# Patient Record
Sex: MALE | Race: BLACK OR AFRICAN AMERICAN | Employment: UNEMPLOYED | URBAN - METROPOLITAN AREA
[De-identification: names, ages, dates, MRNs, and addresses within clinical notes are randomized per-mention and may not be internally consistent; named-entity substitution may affect disease eponyms.]

---

## 2020-08-03 ENCOUNTER — HOSPITAL ENCOUNTER (OUTPATIENT)
Dept: GENERAL RADIOLOGY | Age: 25
Discharge: HOME | End: 2020-08-05
Payer: COMMERCIAL

## 2020-08-03 ENCOUNTER — HOSPITAL ENCOUNTER (OUTPATIENT)
Age: 25
Discharge: HOME | End: 2020-08-05
Payer: COMMERCIAL

## 2020-08-03 PROCEDURE — 73610 X-RAY EXAM OF ANKLE: CPT

## 2021-06-04 ENCOUNTER — HOSPITAL ENCOUNTER (EMERGENCY)
Age: 26
Discharge: HOME | End: 2021-06-04
Payer: COMMERCIAL

## 2021-06-04 VITALS
HEART RATE: 87 BPM | RESPIRATION RATE: 16 BRPM | DIASTOLIC BLOOD PRESSURE: 68 MMHG | TEMPERATURE: 97.9 F | SYSTOLIC BLOOD PRESSURE: 136 MMHG | OXYGEN SATURATION: 99 %

## 2021-06-04 DIAGNOSIS — Z20.2 EXPOSURE TO STD: Primary | ICD-10-CM

## 2021-06-04 PROCEDURE — 6370000000 HC RX 637 (ALT 250 FOR IP): Performed by: PHYSICIAN ASSISTANT

## 2021-06-04 PROCEDURE — 6360000002 HC RX W HCPCS: Performed by: PHYSICIAN ASSISTANT

## 2021-06-04 PROCEDURE — 96372 THER/PROPH/DIAG INJ SC/IM: CPT

## 2021-06-04 PROCEDURE — 2500000003 HC RX 250 WO HCPCS

## 2021-06-04 PROCEDURE — 99283 EMERGENCY DEPT VISIT LOW MDM: CPT

## 2021-06-04 RX ORDER — CEFTRIAXONE 1 G/1
500 INJECTION, POWDER, FOR SOLUTION INTRAMUSCULAR; INTRAVENOUS ONCE
Status: COMPLETED | OUTPATIENT
Start: 2021-06-04 | End: 2021-06-04

## 2021-06-04 RX ORDER — LIDOCAINE HYDROCHLORIDE 10 MG/ML
INJECTION, SOLUTION EPIDURAL; INFILTRATION; INTRACAUDAL; PERINEURAL
Status: COMPLETED
Start: 2021-06-04 | End: 2021-06-04

## 2021-06-04 RX ORDER — AZITHROMYCIN 250 MG/1
1000 TABLET, FILM COATED ORAL ONCE
Status: COMPLETED | OUTPATIENT
Start: 2021-06-04 | End: 2021-06-04

## 2021-06-04 RX ADMIN — LIDOCAINE HYDROCHLORIDE 2 ML: 10 INJECTION, SOLUTION EPIDURAL; INFILTRATION; INTRACAUDAL; PERINEURAL at 17:28

## 2021-06-04 RX ADMIN — AZITHROMYCIN 1000 MG: 250 TABLET, FILM COATED ORAL at 17:27

## 2021-06-04 RX ADMIN — CEFTRIAXONE 500 MG: 1 INJECTION, POWDER, FOR SOLUTION INTRAMUSCULAR; INTRAVENOUS at 17:27

## 2021-06-04 NOTE — ED PROVIDER NOTES
[facility]                                                                                                                                    Department of Emergency Medicine   ED  Provider Note  Admit Date/RoomTime: 6/4/2021  3:52 PM  ED Room: 29/29        HPI:  6/4/21,   Time: 5:03 PM EDT         Todd Spatz is a 22 y.o. male presenting to the ED for exposure to STD, beginning few days ago. The complaint has been persistent, moderate in severity, and worsened by nothing. The patient states that his girlfriend informed him that she had chlamydia. They have been sexually active the night in the past few days. He states that he has no symptoms whatsoever. Denies any urinary burning, frequency, testicular swelling or flank pain. ROS:     Constitutional: Negative for fever and chills  HENT: Negative for ear pain, sore throat and sinus pressure  Eyes: Negative for pain, discharge and redness  Respiratory:  Negative for shortness of breath, cough and wheezing  Cardiovascular: Negative for CP, edema or palpitations  Gastrointestinal: Negative for nausea, vomiting, diarrhea and abdominal distention  Genitourinary:  See HPI  Musculoskeletal: Negative for back pain and arthralgia  Skin: Negative for rash and wound  Neurological: Negative for weakness and headaches  Hematological: Negative for adenopathy    All other systems reviewed and are negative      -------------------------------- PAST HISTORY ----------------------------------  Past Medical History:  has no past medical history on file. Past Surgical History:  has no past surgical history on file. Social History:  reports that he has never smoked. He has never used smokeless tobacco. He reports that he does not drink alcohol and does not use drugs. Family History: family history is not on file. The patients home medications have been reviewed. Allergies: Patient has no known allergies.     --------------------------------- RESULTS ------------------------------------------  All laboratory and radiology results have been personally reviewed by myself   LABS:  No results found for this visit on 06/04/21. RADIOLOGY:  Interpreted by Radiologist.  No orders to display       ----------------- NURSING NOTES AND VITALS REVIEWED ---------------   The nursing notes within the ED encounter and vital signs as below have been reviewed. /68   Pulse 87   Temp 97.9 °F (36.6 °C) (Tympanic)   Resp 16   SpO2 99%   Oxygen Saturation Interpretation: Normal      --------------------------------PHYSICAL EXAM------------------------------------      Constitutional/General: Alert and oriented x3, well appearing, non toxic in NAD  Head: NC/AT  Eyes: PERRL, EOMI  Mouth: Oropharynx clear, handling secretions, no trismus  Neck: Supple, full ROM, no meningeal signs  Pulmonary: Lungs clear to auscultation bilaterally, no wheezes, rales, or rhonchi. Not in respiratory distress  Cardiovascular:  Regular rate and rhythm, no murmurs, gallops, or rubs. 2+ distal pulses  Abdomen: Soft, + BS. No distension. Nontender. No palpable rigidity, rebound or guarding  :  Deferred  Extremities: Moves all extremities x 4. Warm and well perfused  Skin: warm and dry without rash  Neurologic: GCS 15,  Intact. No focal deficits  Psych: Normal Affect      ------------------------ ED COURSE/MEDICAL DECISION MAKING----------------------  Medications   cefTRIAXone (ROCEPHIN) injection 500 mg (has no administration in time range)   azithromycin (ZITHROMAX) tablet 1,000 mg (has no administration in time range)         Medical Decision Making:    Pt will be treated today with Rocephin and Zithromax. Discussed need to ensure no sexual intercourse for 7 full days. F/U PCP if persistent or ongoing complaints. Counseling:    The emergency provider has spoken with the patient and discussed todays results, in addition to providing specific details for the plan of care and

## 2022-05-14 ENCOUNTER — HOSPITAL ENCOUNTER (INPATIENT)
Age: 27
LOS: 5 days | Discharge: HOME | DRG: 753 | End: 2022-05-20
Attending: EMERGENCY MEDICINE | Admitting: PSYCHIATRY & NEUROLOGY
Payer: MEDICAID

## 2022-05-14 DIAGNOSIS — S61.217A LACERATION OF LEFT LITTLE FINGER WITHOUT FOREIGN BODY WITHOUT DAMAGE TO NAIL, INITIAL ENCOUNTER: Primary | ICD-10-CM

## 2022-05-14 DIAGNOSIS — F32.A DEPRESSION, UNSPECIFIED DEPRESSION TYPE: ICD-10-CM

## 2022-05-14 LAB
ACETAMINOPHEN LEVEL: <5 MCG/ML (ref 10–30)
ALBUMIN SERPL-MCNC: 4.9 G/DL (ref 3.5–5.2)
ALP BLD-CCNC: 63 U/L (ref 40–129)
ALT SERPL-CCNC: 31 U/L (ref 0–40)
ANION GAP SERPL CALCULATED.3IONS-SCNC: 17 MMOL/L (ref 7–16)
AST SERPL-CCNC: 27 U/L (ref 0–39)
BASOPHILS ABSOLUTE: 0.05 E9/L (ref 0–0.2)
BASOPHILS RELATIVE PERCENT: 0.7 % (ref 0–2)
BILIRUB SERPL-MCNC: 0.8 MG/DL (ref 0–1.2)
BILIRUBIN URINE: NEGATIVE
BLOOD, URINE: ABNORMAL
BUN BLDV-MCNC: 6 MG/DL (ref 6–20)
CALCIUM SERPL-MCNC: 9.5 MG/DL (ref 8.6–10.2)
CHLORIDE BLD-SCNC: 96 MMOL/L (ref 98–107)
CLARITY: CLEAR
CO2: 24 MMOL/L (ref 22–29)
COLOR: YELLOW
CREAT SERPL-MCNC: 1 MG/DL (ref 0.7–1.2)
EOSINOPHILS ABSOLUTE: 0.03 E9/L (ref 0.05–0.5)
EOSINOPHILS RELATIVE PERCENT: 0.4 % (ref 0–6)
ETHANOL: <10 MG/DL (ref 0–0.08)
GFR AFRICAN AMERICAN: >60
GFR NON-AFRICAN AMERICAN: >60 ML/MIN/1.73
GLUCOSE BLD-MCNC: 88 MG/DL (ref 74–99)
GLUCOSE URINE: NEGATIVE MG/DL
HCT VFR BLD CALC: 42.9 % (ref 37–54)
HEMOGLOBIN: 14.6 G/DL (ref 12.5–16.5)
IMMATURE GRANULOCYTES #: 0.02 E9/L
IMMATURE GRANULOCYTES %: 0.3 % (ref 0–5)
KETONES, URINE: ABNORMAL MG/DL
LEUKOCYTE ESTERASE, URINE: ABNORMAL
LYMPHOCYTES ABSOLUTE: 2.6 E9/L (ref 1.5–4)
LYMPHOCYTES RELATIVE PERCENT: 35.7 % (ref 20–42)
MCH RBC QN AUTO: 27.8 PG (ref 26–35)
MCHC RBC AUTO-ENTMCNC: 34 % (ref 32–34.5)
MCV RBC AUTO: 81.6 FL (ref 80–99.9)
MONOCYTES ABSOLUTE: 0.55 E9/L (ref 0.1–0.95)
MONOCYTES RELATIVE PERCENT: 7.5 % (ref 2–12)
NEUTROPHILS ABSOLUTE: 4.04 E9/L (ref 1.8–7.3)
NEUTROPHILS RELATIVE PERCENT: 55.4 % (ref 43–80)
NITRITE, URINE: NEGATIVE
PDW BLD-RTO: 13.2 FL (ref 11.5–15)
PH UA: 7 (ref 5–9)
PLATELET # BLD: 353 E9/L (ref 130–450)
PMV BLD AUTO: 9.3 FL (ref 7–12)
POTASSIUM SERPL-SCNC: 3.3 MMOL/L (ref 3.5–5)
PROTEIN UA: NEGATIVE MG/DL
RBC # BLD: 5.26 E12/L (ref 3.8–5.8)
SALICYLATE, SERUM: <0.3 MG/DL (ref 0–30)
SODIUM BLD-SCNC: 137 MMOL/L (ref 132–146)
SPECIFIC GRAVITY UA: <=1.005 (ref 1–1.03)
TOTAL PROTEIN: 7.6 G/DL (ref 6.4–8.3)
TRICYCLIC ANTIDEPRESSANTS SCREEN SERUM: NEGATIVE NG/ML
UROBILINOGEN, URINE: 0.2 E.U./DL
WBC # BLD: 7.3 E9/L (ref 4.5–11.5)

## 2022-05-14 PROCEDURE — 6360000002 HC RX W HCPCS: Performed by: EMERGENCY MEDICINE

## 2022-05-14 PROCEDURE — 80143 DRUG ASSAY ACETAMINOPHEN: CPT

## 2022-05-14 PROCEDURE — 82077 ASSAY SPEC XCP UR&BREATH IA: CPT

## 2022-05-14 PROCEDURE — 85025 COMPLETE CBC W/AUTO DIFF WBC: CPT

## 2022-05-14 PROCEDURE — 12001 RPR S/N/AX/GEN/TRNK 2.5CM/<: CPT

## 2022-05-14 PROCEDURE — 93005 ELECTROCARDIOGRAM TRACING: CPT | Performed by: PHYSICIAN ASSISTANT

## 2022-05-14 PROCEDURE — 99285 EMERGENCY DEPT VISIT HI MDM: CPT

## 2022-05-14 PROCEDURE — 80053 COMPREHEN METABOLIC PANEL: CPT

## 2022-05-14 PROCEDURE — 96372 THER/PROPH/DIAG INJ SC/IM: CPT

## 2022-05-14 PROCEDURE — 80307 DRUG TEST PRSMV CHEM ANLYZR: CPT

## 2022-05-14 PROCEDURE — 81001 URINALYSIS AUTO W/SCOPE: CPT

## 2022-05-14 PROCEDURE — 80179 DRUG ASSAY SALICYLATE: CPT

## 2022-05-14 RX ORDER — TETANUS AND DIPHTHERIA TOXOIDS ADSORBED 2; 2 [LF]/.5ML; [LF]/.5ML
0.5 INJECTION INTRAMUSCULAR ONCE
Status: DISCONTINUED | OUTPATIENT
Start: 2022-05-14 | End: 2022-05-20 | Stop reason: HOSPADM

## 2022-05-14 RX ORDER — LORAZEPAM 2 MG/ML
2 INJECTION INTRAMUSCULAR ONCE
Status: COMPLETED | OUTPATIENT
Start: 2022-05-14 | End: 2022-05-14

## 2022-05-14 RX ADMIN — LORAZEPAM 2 MG: 2 INJECTION INTRAMUSCULAR; INTRAVENOUS at 23:29

## 2022-05-14 ASSESSMENT — PAIN - FUNCTIONAL ASSESSMENT: PAIN_FUNCTIONAL_ASSESSMENT: NONE - DENIES PAIN

## 2022-05-14 NOTE — LETTER
[address]  Phone: [phone]          May 20, 2022     Patient: Herve Vee   YOB: 1995   Date of Visit: 5/14/2022       To Whom It May Concern:     Herve Vee may return to work/ school with full participation immediately with no restrictions. If you have any questions or concerns, please don't hesitate to call.     Sincerely,            Anneliese Ruiz

## 2022-05-15 PROBLEM — F30.10 MANIC BEHAVIOR (HCC): Status: ACTIVE | Noted: 2022-05-15

## 2022-05-15 LAB
AMPHETAMINE SCREEN, URINE: NOT DETECTED
BACTERIA: NORMAL /HPF
BARBITURATE SCREEN URINE: NOT DETECTED
BENZODIAZEPINE SCREEN, URINE: NOT DETECTED
CANNABINOID SCREEN URINE: POSITIVE
COCAINE METABOLITE SCREEN URINE: NOT DETECTED
FENTANYL SCREEN, URINE: NOT DETECTED
INFLUENZA A: NOT DETECTED
INFLUENZA B: NOT DETECTED
Lab: ABNORMAL
METHADONE SCREEN, URINE: NOT DETECTED
OPIATE SCREEN URINE: NOT DETECTED
OXYCODONE URINE: NOT DETECTED
PHENCYCLIDINE SCREEN URINE: NOT DETECTED
RBC UA: NORMAL /HPF (ref 0–2)
SARS-COV-2 RNA, RT PCR: NOT DETECTED
WBC UA: NORMAL /HPF (ref 0–5)

## 2022-05-15 PROCEDURE — 6360000002 HC RX W HCPCS: Performed by: STUDENT IN AN ORGANIZED HEALTH CARE EDUCATION/TRAINING PROGRAM

## 2022-05-15 PROCEDURE — 87636 SARSCOV2 & INF A&B AMP PRB: CPT

## 2022-05-15 PROCEDURE — 2580000003 HC RX 258

## 2022-05-15 PROCEDURE — 1240000000 HC EMOTIONAL WELLNESS R&B

## 2022-05-15 PROCEDURE — 6360000002 HC RX W HCPCS: Performed by: EMERGENCY MEDICINE

## 2022-05-15 PROCEDURE — 6360000002 HC RX W HCPCS: Performed by: PSYCHIATRY & NEUROLOGY

## 2022-05-15 PROCEDURE — 6370000000 HC RX 637 (ALT 250 FOR IP): Performed by: PSYCHIATRY & NEUROLOGY

## 2022-05-15 PROCEDURE — 6370000000 HC RX 637 (ALT 250 FOR IP): Performed by: EMERGENCY MEDICINE

## 2022-05-15 RX ORDER — DIPHENHYDRAMINE HYDROCHLORIDE 50 MG/ML
50 INJECTION INTRAMUSCULAR; INTRAVENOUS EVERY 6 HOURS PRN
Status: DISCONTINUED | OUTPATIENT
Start: 2022-05-15 | End: 2022-05-20 | Stop reason: HOSPADM

## 2022-05-15 RX ORDER — HYDROXYZINE PAMOATE 50 MG/1
50 CAPSULE ORAL 3 TIMES DAILY PRN
Status: DISCONTINUED | OUTPATIENT
Start: 2022-05-15 | End: 2022-05-20 | Stop reason: HOSPADM

## 2022-05-15 RX ORDER — ACETAMINOPHEN 325 MG/1
650 TABLET ORAL EVERY 6 HOURS PRN
Status: DISCONTINUED | OUTPATIENT
Start: 2022-05-15 | End: 2022-05-20 | Stop reason: HOSPADM

## 2022-05-15 RX ORDER — LORAZEPAM 1 MG/1
2 TABLET ORAL ONCE
Status: COMPLETED | OUTPATIENT
Start: 2022-05-15 | End: 2022-05-15

## 2022-05-15 RX ORDER — ZIPRASIDONE MESYLATE 20 MG/ML
10 INJECTION, POWDER, LYOPHILIZED, FOR SOLUTION INTRAMUSCULAR ONCE
Status: COMPLETED | OUTPATIENT
Start: 2022-05-15 | End: 2022-05-15

## 2022-05-15 RX ORDER — DIVALPROEX SODIUM 500 MG/1
500 TABLET, DELAYED RELEASE ORAL 2 TIMES DAILY
Status: DISCONTINUED | OUTPATIENT
Start: 2022-05-15 | End: 2022-05-20 | Stop reason: HOSPADM

## 2022-05-15 RX ORDER — OLANZAPINE 5 MG/1
10 TABLET, ORALLY DISINTEGRATING ORAL NIGHTLY
Status: DISCONTINUED | OUTPATIENT
Start: 2022-05-15 | End: 2022-05-16

## 2022-05-15 RX ORDER — HALOPERIDOL 5 MG
5 TABLET ORAL EVERY 6 HOURS PRN
Status: DISCONTINUED | OUTPATIENT
Start: 2022-05-15 | End: 2022-05-20 | Stop reason: HOSPADM

## 2022-05-15 RX ORDER — HALOPERIDOL 5 MG/ML
5 INJECTION INTRAMUSCULAR EVERY 6 HOURS PRN
Status: DISCONTINUED | OUTPATIENT
Start: 2022-05-15 | End: 2022-05-20 | Stop reason: HOSPADM

## 2022-05-15 RX ORDER — LANOLIN ALCOHOL/MO/W.PET/CERES
3 CREAM (GRAM) TOPICAL NIGHTLY
Status: DISCONTINUED | OUTPATIENT
Start: 2022-05-15 | End: 2022-05-20 | Stop reason: HOSPADM

## 2022-05-15 RX ORDER — NICOTINE 21 MG/24HR
1 PATCH, TRANSDERMAL 24 HOURS TRANSDERMAL DAILY
Status: DISCONTINUED | OUTPATIENT
Start: 2022-05-15 | End: 2022-05-20 | Stop reason: HOSPADM

## 2022-05-15 RX ORDER — MAGNESIUM HYDROXIDE/ALUMINUM HYDROXICE/SIMETHICONE 120; 1200; 1200 MG/30ML; MG/30ML; MG/30ML
30 SUSPENSION ORAL PRN
Status: DISCONTINUED | OUTPATIENT
Start: 2022-05-15 | End: 2022-05-20 | Stop reason: HOSPADM

## 2022-05-15 RX ORDER — LORAZEPAM 2 MG/ML
2 INJECTION INTRAMUSCULAR EVERY 6 HOURS PRN
Status: DISCONTINUED | OUTPATIENT
Start: 2022-05-15 | End: 2022-05-20 | Stop reason: HOSPADM

## 2022-05-15 RX ORDER — LORAZEPAM 2 MG/ML
2 INJECTION INTRAMUSCULAR ONCE
Status: COMPLETED | OUTPATIENT
Start: 2022-05-15 | End: 2022-05-15

## 2022-05-15 RX ADMIN — DIVALPROEX SODIUM 500 MG: 500 TABLET, DELAYED RELEASE ORAL at 22:46

## 2022-05-15 RX ADMIN — LORAZEPAM 2 MG: 1 TABLET ORAL at 03:26

## 2022-05-15 RX ADMIN — Medication 3 MG: at 22:46

## 2022-05-15 RX ADMIN — LORAZEPAM 2 MG: 2 INJECTION INTRAMUSCULAR; INTRAVENOUS at 11:58

## 2022-05-15 RX ADMIN — OLANZAPINE 10 MG: 5 TABLET, ORALLY DISINTEGRATING ORAL at 22:46

## 2022-05-15 RX ADMIN — WATER 10 ML: 1 INJECTION INTRAMUSCULAR; INTRAVENOUS; SUBCUTANEOUS at 04:04

## 2022-05-15 RX ADMIN — ZIPRASIDONE MESYLATE 10 MG: 20 INJECTION, POWDER, LYOPHILIZED, FOR SOLUTION INTRAMUSCULAR at 04:04

## 2022-05-15 RX ADMIN — HALOPERIDOL LACTATE 5 MG: 5 INJECTION, SOLUTION INTRAMUSCULAR at 19:22

## 2022-05-15 RX ADMIN — DIPHENHYDRAMINE HYDROCHLORIDE 50 MG: 50 INJECTION, SOLUTION INTRAMUSCULAR; INTRAVENOUS at 19:22

## 2022-05-15 RX ADMIN — LORAZEPAM 2 MG: 2 INJECTION INTRAMUSCULAR; INTRAVENOUS at 19:22

## 2022-05-15 ASSESSMENT — LIFESTYLE VARIABLES
HOW MANY STANDARD DRINKS CONTAINING ALCOHOL DO YOU HAVE ON A TYPICAL DAY: 10 OR MORE
HOW OFTEN DO YOU HAVE A DRINK CONTAINING ALCOHOL: 4 OR MORE TIMES A WEEK

## 2022-05-15 ASSESSMENT — SLEEP AND FATIGUE QUESTIONNAIRES
DO YOU HAVE DIFFICULTY SLEEPING: YES
AVERAGE NUMBER OF SLEEP HOURS: 5
DO YOU USE A SLEEP AID: NO
SLEEP PATTERN: DIFFICULTY FALLING ASLEEP;DISTURBED/INTERRUPTED SLEEP;INSOMNIA;NIGHTMARES/TERRORS

## 2022-05-15 ASSESSMENT — PATIENT HEALTH QUESTIONNAIRE - PHQ9: SUM OF ALL RESPONSES TO PHQ QUESTIONS 1-9: 24

## 2022-05-15 NOTE — ED NOTES
There are no beds available, at this time, at this facility. Will refer to the [address].       Pamela Sharma, [place]  05/15/22 6412

## 2022-05-15 NOTE — BH NOTE
[facility]  Admission Note     Admission Type:   Admission Type: Involuntary    Reason for admission:  Reason for Admission: \"I cut my finger\" pr pt. PATIENT STRENGTHS:       Patient Strengths and Limitations:       Addictive Behavior:   Addictive Behavior  In the Past 3 Months, Have You Felt or Has Someone Told You That You Have a Problem With  : Eating (too much/too little)    Medical Problems:   History reviewed. No pertinent past medical history. Status EXAM:  Mental Status and Behavioral Exam  Normal: No  Level of Assistance: Independent/Self  Facial Expression: Sad,Worried  Affect: Appropriate  Level of Consciousness: Alert  Frequency of Checks: 4 times per hour, close  Mood:Normal: No  Mood: Depressed,Ashamed/humiliated  Motor Activity:Normal: Yes  Eye Contact: Fair  Observed Behavior: Cooperative  Sexual Misconduct History: Current - no  Preception: [place] to person,[place] to place,[place] to time,[place] to situation  Attention:Normal: No  Attention: Distractible  Thought Processes: Circumstantial  Thought Content:Normal: Yes  Depression Symptoms: Feelings of hopelessess  Anxiety Symptoms: No problems reported or observed. Molly Symptoms: No problems reported or observed. Hallucinations: None  Delusions: No  Memory:Normal: Yes  Insight and Judgment: No  Insight and Judgment: Poor insight    Tobacco Screening:  Practical Counseling, on admission, anna marie X, if applicable and completed (first 3 are required if patient doesn't refuse):             ( x)  Recognizing danger situations (included triggers and roadblocks)                    (x )  Coping skills (new ways to manage stress, exercise, relaxation techniques, changing routine, distraction)                                                           (x )  Basic information about quitting (benefits of quitting, techniques in how to quit, available resources  ( ) Referral for counseling faxed to [facility] ( ) Patient refused counseling  ( ) Patient has not smoked in the last 30 days    Metabolic Screening:    No results found for: LABA1C    No results found for: CHOL  No results found for: TRIG  No results found for: HDL  No components found for: LDLCAL  No results found for: LABVLDL      Body mass index is 24.41 kg/m². BP Readings from Last 2 Encounters:   05/15/22 135/72   06/04/21 136/68           Pt admitted with followings belongings:  Dental Appliances: None  Vision - Corrective Lenses: None  Hearing Aid: None  Jewelry: None  Clothing: Other (Comment) (none)  Other Valuables: Money     Patient's home medications were n/a. Patient oriented to surroundings and program expectations and copy of patient rights given. Received admission packet: yes. Consents reviewed, signed: yes. Refused: no. Patient verbalize understanding: yes. Patient education on precautions: yes.                   Lynette Martinez RN

## 2022-05-15 NOTE — ED NOTES
Patient brought back to the [facility] escorted by patients father and and patients aunt due to patient request. Hali Zapata aware. Patient not [id number] tested in ED unit or given boostrix in oh bed in the ED due to agitation. Patient report given to [facility], questions answered. Family aware that no visitors are allowed in [facility] at this time. Patient adam harrington was once in the [facility], Dr. Sherel Bence notified.       Arcelia Edwards RN  05/15/22 2420

## 2022-05-15 NOTE — ED NOTES
Patient punched wall. SABINO Duffy at bedside conversing with patient. Pt tearful with conversation. Medicated for agitation.       Diogo Wilson RN  05/15/22 2747

## 2022-05-15 NOTE — ED NOTES
Behavioral Health Crisis Assessment    SEKOU  met with patient to complete Biopsychosocial Assessment, CSSRS and SBIRT    Chief Complaint:    Patient reports he came to the hospital because he cut his finger with a switch blade. Patient is a 31 yo male presenting to the ED by private vehicle after cutting his 5th digit on right hand with a switch blade while he was at work. Patient reports he was having suicidal thoughts while he was doing this but cutting his finger was not a suicidal attempt. Patient reports he knew that small of a cut would not end his life. Patient then tells this SW he recently purchased some rope and watched a video of how to tie it into a noose, tied it into a noose and placed it around his neck. Patient did not hang it, but did remove it when a friend came over and put the noose in a closet and claims he has not seen it since. Patient reports he is also homicidal w/o plan towards any males he might find EXGF with. Patient reports this all started on April 25 when his girlfriend of 5 years broke up with him. Patient reports he was been doing things that he knows is not good. Patient reports stalking by calling excessively, following her excessively, etc.. until she had him served with a order of protection. Patient attempt to dominant the assessment by talking about her excessively. Patient reports that he started to drink more and tried ecstasy. Mental Status Exam:    Patient is anxious, oriented x 3 and alert, Affect is labile, Mood is sad/depressed, tearful at times, manic like behaviors, panicked, irritable, defensive, Speech is rambling/rapid, Hygiene is fair, Eye Contact is fair.     Legal Status  [] Voluntary:  [x] Involuntary, Issued by: ED Doc, Dr. Davonte Mcbride MD    Gender  [x] Male [] Female [] Transgender  [] Other    Sexual Orientation    [x] Heterosexual [] Homosexual [] Bisexual [] Other    Brief Clinical Summary:     Patient denies past mental health hx, denies mental health meds and is not connected with outpatient mental health services. Patient denies any mental health hospitalizations. Patient does agree that he has something seriously going wrong with his mental health and that he needs help. Collateral Information:     Patient had multiple family members at bed side who where refusing to get patient his phone back. This was making patient more agitated. Family was asked to leave so that patient could calm done. Risk Factors:    Substance use  Recent breakup with girlfriend  Trouble with the law  Undiagnosed mental health Dx    Protective Factors:    Strong family Support: Mom, Dad, Aunt  Help seeking behavior  Safe and stable housing  Has access to essential needs  No acces to weapons    Suicidal Behavioral:   [x] Reports:    [] Past [x] Present   [] Denies    C-SSRS Screening Completed by RN: Current Suicide Risk:  [] None  [] Low [] Moderate [x] High    Homicidal/ Violent Behavior  [x] Reports:   [] Past [x] Present   [] Denies     Self Injurious/Self Mutilation Behaviors:   [x] Reports:    [] Past [x] Present   [] Denies    Hallucinations/Delusions   [] Reports:   [x] Denies     Substance Use/Alcohol Use/Addiction:   [x] Reports:   [] Denies   [x] SBIRT Screen Complete. Current or Past Substance Abuse Treatment  [] Yes, When and Where:  [x] No    Current or Past Mental Health Treatment:  [] Yes, When and Where:  [x] No    Legal Issues:  [x]  Yes (Specify) Order of protection  []  No    Access to Weapons:  []  Yes (Specify)  [x]  No    Trauma History  [] Reports:  [x] Denies     Living Situation:    Lives by himself    Employment:    Works at [employer] works    Education Level:    Unknown    Violence Risk Screenin. Have you ever thought about hurting someone? [x]  No  []  Yes (Ask the questions listed below)   When?  Did you follow through with the thoughts? [] No     [] Yes- When and what happened?   2.  Have you ever threatened anyone? [x]  No  []  Yes (Ask the questions listed below)   When and what happened?  Have you ever threatened someone with a gun, knife or other weapon? []  No  []  Yes - When and what happened? 2. Have you ever had an order of protection taken out against you? []  Yes [x]  No  3. Have you ever been arrested due to violence? []  Yes [x]  No  4. Have you ever been cruel to animals? []  Yes [x]  No    Although patient reported no to these answers it is SW believe that patient may not have been honest with his answers.     After consideration of C-SSRS screening results, C-SSRS assessments, and this professional's assessment the patient's overall suicide risk assessed to be:  [] None   [] Low   [] Moderate   [x] High     [x] Discussed current suicide risk, protective and risk factors with RN and ED Physician     Disposition   [] Home:   [] Outpatient Provider:   [] Crisis Unit:   [x] Inpatient Psychiatric Unit:  [] Other:    SEKOU SW will pursue inpatient admission                     BENJIE Boyd, [place]  05/15/22 4707

## 2022-05-15 NOTE — ED NOTES
Nurse to nurse report given to Avery Betts on [address].      Marce Siddiqui RN  05/15/22 0352

## 2022-05-15 NOTE — ED NOTES
Patient crying and upset with family at the bedside. Patient is suicidal with plan. Charge nurse aware that patient needs at . [address].  Patients belongings secured at this time      Savage Islas, RN  05/14/22 [facility]  05/15/22 5681

## 2022-05-15 NOTE — ED NOTES
Patient Father's Sadia Lundberg) phone number is [phone]  And cousin Gabbi [facility] [phone].      Lg Cruz RN  05/15/22 6719

## 2022-05-15 NOTE — ED NOTES
Father has been in and out visiting. Patient upset and crying after phone call father calming patient. Offered medication to patient who accepted.      Jarvis Bautista RN  05/15/22 1905

## 2022-05-15 NOTE — ED NOTES
SEKOU GALLO called the [employer] [facility] and spoke with Kourtney Lange RN to refer this patient but there was no Covid test done. Patient RN notified.      Nargis Jackson, MSW, LSW  05/15/22 7412

## 2022-05-15 NOTE — ED NOTES
Patient screaming and causing a scene, police and provider at the bedside. Patient non redirectable. Patient is manic.  Patient medicated as ordered      Shelbie Huertas RN  05/15/22 5606

## 2022-05-15 NOTE — ED NOTES
SW called [facility] and spoke with [place] to provide referral on pt's behalf.      Jose Antonio Castano, [place]  05/15/22 0838

## 2022-05-15 NOTE — ED NOTES
SW got a call from [place] at the [facility], stating they have exhausted all resources and are unable to find placement for pt and are referring back to [facility].       Lena Anderson, [facility]  05/15/22 [address], [facility]  05/15/22 1210

## 2022-05-15 NOTE — ED NOTES
Patient became agitated towards to end of assessment. SABINO informed charge RN that patients belongings and shoes where still at beside. SABINO informed ED Doc that patient had not been pink slipped at that time.      BENJIE Farr, [facility]  05/15/22 2385

## 2022-05-15 NOTE — ED NOTES
Per Jeffery Roque no bed available yet for me to call to present the patient for admission.      Farheen Garza RN  05/15/22 1401

## 2022-05-15 NOTE — ED PROVIDER NOTES
ED Attending Shared Visit: CC:  [facility]  Department of Emergency Medicine   ED  Encounter Note  Admit Date/RoomTime: 2022 10:22 PM  ED Room: [facility]    NAME: Bonnie Whitmore  : 1995  MRN: [id number]     Chief Complaint:  Laceration (right 5th digit laceration. patient states that he cut himself on purpose. patient states that he does want to hurt himself. )    History of Present Illness       Bonnie Whitmore is a 32 y.o. old male presenting to the emergency department by private vehicle, for a laceration to the LLF, caused by pocket knife, which occurred as attempt to harm himself at home. Family sts he has been having suicidal thoughts lately and is due for his first appointment at [facility] next week. Tetanus Status:  unknown. ROS   Pertinent positives and negatives are stated within HPI, all other systems reviewed and are negative. Past Medical History:  has no past medical history on file. Surgical History:  has no past surgical history on file. Social History:  reports that he has never smoked. He has never used smokeless tobacco. He reports that he does not drink alcohol and does not use drugs. Family History: family history is not on file. Allergies: Patient has no known allergies. Physical Exam  Physical Exam   Oxygen Saturation Interpretation: Normal.        ED Triage Vitals [22 2207]   BP Temp Temp src Pulse Resp SpO2 Height Weight   (!) 139/92 98.1 °F (36.7 °C) -- 84 18 98 % 6' 1\" (1.854 m) 185 lb (83.9 kg)         Constitutional:  Alert, development consistent with age. HEENT:  NC/NT. Airway patent. Neck:  Normal ROM. Supple. Non-tender. Digits/Fingers:   Left Little finger proximal phalanx volar aspect. Tenderness:  mild. .              Swelling: None. Deformity: no deformity observed/palpated. ROM: full range with pain.              Skin: There is a linear laceration with no evidence of foreign body proximal phalanx little finger. .       Neurovascular: Motor deficit: none. Sensory deficit: none. Pulse deficit: none. Capillary refill: normal.  Hand:  Left all metacarpals            Tenderness:  none. Swelling: None. Deformity: no deformity observed/palpated. Skin:  no wounds, erythema, or swelling. Lymphatics: No lymphangitis or adenopathy noted. Neurological:  Oriented. Motor functions intact. Psych;   Admits to  with plan, denies homicidal ideation    Lab / Imaging Results   (All laboratory and radiology results have been personally reviewed by myself)  Labs:  Results for orders placed or performed during the hospital encounter of 05/14/22   CBC with Auto Differential   Result Value Ref Range    WBC 7.3 4.5 - 11.5 E9/L    RBC 5.26 3.80 - 5.80 E12/L    Hemoglobin 14.6 12.5 - 16.5 g/dL    Hematocrit 42.9 37.0 - 54.0 %    MCV 81.6 80.0 - 99.9 fL    MCH 27.8 26.0 - 35.0 pg    MCHC 34.0 32.0 - 34.5 %    RDW 13.2 11.5 - 15.0 fL    Platelets 082 880 - 887 E9/L    MPV 9.3 7.0 - 12.0 fL    Neutrophils % 55.4 43.0 - 80.0 %    Immature Granulocytes % 0.3 0.0 - 5.0 %    Lymphocytes % 35.7 20.0 - 42.0 %    Monocytes % 7.5 2.0 - 12.0 %    Eosinophils % 0.4 0.0 - 6.0 %    Basophils % 0.7 0.0 - 2.0 %    Neutrophils Absolute 4.04 1.80 - 7.30 E9/L    Immature Granulocytes # 0.02 E9/L    Lymphocytes Absolute 2.60 1.50 - 4.00 E9/L    Monocytes Absolute 0.55 0.10 - 0.95 E9/L    Eosinophils Absolute 0.03 (L) 0.05 - 0.50 E9/L    Basophils Absolute 0.05 0.00 - 0.20 E9/L   Comprehensive Metabolic Panel   Result Value Ref Range    Sodium 137 132 - 146 mmol/L    Potassium 3.3 (L) 3.5 - 5.0 mmol/L    Chloride 96 (L) 98 - 107 mmol/L    CO2 24 22 - 29 mmol/L    Anion Gap 17 (H) 7 - 16 mmol/L    Glucose 88 74 - 99 mg/dL    BUN 6 6 - 20 mg/dL    CREATININE 1.0 0.7 - 1.2 mg/dL    GFR Non-African American >60 >=60 mL/min/1.73    GFR degrees    R Axis 77 degrees    T Axis 51 degrees     Imaging: All Radiology results interpreted by Radiologist unless otherwise noted. No orders to display     ED Course / Medical Decision Making     Medications   diptheria-tetanus toxoids [facility]) 2-2 LF/0.5ML injection 0.5 mL (has no administration in time range)   LORazepam (ATIVAN) injection 2 mg (2 mg IntraMUSCular Given 5/14/22 4235)        Consult(s):   IP CONSULT TO SOCIAL WORK    Procedure(s):   PROCEDURE NOTE  5/14/22       Time: 0045    LACERATION REPAIR  Risks, benefits and alternatives (for applicable procedures below) described. Performed By: JAYLEN Dodd. Informed consent: Verbal consent obtained. The patient was counseled regarding the procedure in person, it's indications, risks, potential complications and alternatives and any questions were answered. Verbal consent was obtained. Laceration #: 1. Location: LLF  Length: 1cm. The wound area was irrigated with sterile saline and draped in a sterile fashion. Local Anesthesia:  obtained with Lidocaine 1% without epinephrine. The wound was explored with the following results: Thickness: full thickness. no foreign body or tendon injury seen. Debridement: None. Undermining: None. Wound Margins Revised: None. Flaps Aligned: no. The wound was closed in single layer closure with #2  5-0 Ethilon using interrupted suture(s). Dressing:  a gauze dressing. There were no additional wounds requiring formal closure. MDM:   Imaging was not obtained based on low suspicion for fracture / bony abnormality, dislocation, retained foreign bodyas per history/physical findings. Assessment     1. Laceration of left little finger without foreign body without damage to nail, initial encounter    2. Depression, unspecified depression type      Plan   Discharged to Mental Health / Behavioral Unit - Patient is medically cleared for mental/behavioral health unit admission.   Patient condition is stable    New Medications     New Prescriptions    No medications on file     Electronically signed by JAYLEN Cr   DD: 5/14/22  **This report was transcribed using voice recognition software. Every effort was made to ensure accuracy; however, inadvertent computerized transcription errors may be present.   END OF ED PROVIDER NOTE       Neva Brunner, [place] 8091

## 2022-05-15 NOTE — ED NOTES
Patient's aunt pulled RN aside a stated, \"I also want you to know that patient took 2 pills of ecstasy. And didn't say anything because his moms was present. \" Patient denies drug use during triage.      Austin Espinal RN  05/14/22 0997

## 2022-05-15 NOTE — BH NOTE
Pt medicated due to shoving over table in room. Pt is frustrated and remains obsessed over the ex girlfriend. Pt given instruction on how to manage emotions. Pt did make mention of wanting to leave the unit and that, \"I want to make a run for it\" stating he wants to leave the unit. Pt was medicated without complication by Heydi Daley LPN. Will monitor and follow. Oncoming shift to be advised.

## 2022-05-15 NOTE — ED NOTES
The pt was accepted to [facility] room 7516. Disposition called to Moustapha Nichols in admitting.      [facility]  05/15/22 0057

## 2022-05-16 PROBLEM — F31.2 SEVERE MANIC BIPOLAR 1 DISORDER WITH PSYCHOTIC BEHAVIOR (HCC): Status: ACTIVE | Noted: 2022-05-16

## 2022-05-16 LAB
EKG ATRIAL RATE: 58 BPM
EKG P AXIS: 60 DEGREES
EKG P-R INTERVAL: 140 MS
EKG Q-T INTERVAL: 404 MS
EKG QRS DURATION: 98 MS
EKG QTC CALCULATION (BAZETT): 396 MS
EKG R AXIS: 77 DEGREES
EKG T AXIS: 51 DEGREES
EKG VENTRICULAR RATE: 58 BPM

## 2022-05-16 PROCEDURE — 6370000000 HC RX 637 (ALT 250 FOR IP): Performed by: PSYCHIATRY & NEUROLOGY

## 2022-05-16 PROCEDURE — 6370000000 HC RX 637 (ALT 250 FOR IP): Performed by: NURSE PRACTITIONER

## 2022-05-16 PROCEDURE — 90792 PSYCH DIAG EVAL W/MED SRVCS: CPT | Performed by: NURSE PRACTITIONER

## 2022-05-16 PROCEDURE — 1240000000 HC EMOTIONAL WELLNESS R&B

## 2022-05-16 PROCEDURE — 93010 ELECTROCARDIOGRAM REPORT: CPT | Performed by: INTERNAL MEDICINE

## 2022-05-16 RX ORDER — RISPERIDONE 1 MG/1
1 TABLET, FILM COATED ORAL 2 TIMES DAILY
Status: DISCONTINUED | OUTPATIENT
Start: 2022-05-16 | End: 2022-05-17

## 2022-05-16 RX ADMIN — RISPERIDONE 1 MG: 1 TABLET ORAL at 13:19

## 2022-05-16 RX ADMIN — Medication 3 MG: at 20:29

## 2022-05-16 RX ADMIN — RISPERIDONE 1 MG: 1 TABLET ORAL at 20:29

## 2022-05-16 RX ADMIN — DIVALPROEX SODIUM 500 MG: 500 TABLET, DELAYED RELEASE ORAL at 20:30

## 2022-05-16 RX ADMIN — DIVALPROEX SODIUM 500 MG: 500 TABLET, DELAYED RELEASE ORAL at 10:10

## 2022-05-16 ASSESSMENT — SLEEP AND FATIGUE QUESTIONNAIRES
SLEEP PATTERN: DIFFICULTY FALLING ASLEEP;DIFFICULTY ARISING;RESTLESSNESS
DO YOU HAVE DIFFICULTY SLEEPING: YES
AVERAGE NUMBER OF SLEEP HOURS: 5
DO YOU USE A SLEEP AID: NO

## 2022-05-16 ASSESSMENT — PAIN SCALES - GENERAL: PAINLEVEL_OUTOF10: 0

## 2022-05-16 ASSESSMENT — PATIENT HEALTH QUESTIONNAIRE - PHQ9: SUM OF ALL RESPONSES TO PHQ QUESTIONS 1-9: 22

## 2022-05-16 NOTE — CARE COORDINATION
Biopsychosocial Assessment Note    Social work met with patient to complete the biopsychosocial assessment and C-SSRS. Chief Complaint: \"I cut my finger. \" per ED Sw note, \"Patient reports he came to the hospital because he cut his finger with a switch blade. \"    Mental Status Exam: pt alert&oriented x4. Pt cooperative. Mood anxious, sad, labile, affect constricted. Pt eye contact poor, speech mumbled, rapid. Pt is tearful at times. Pt thoughts preoccupied, loose association. Pt insight/judgement poor. Pt denied SI/HI/AVH. Clinical Summary: pt reports he cut his finger because he was thinking about his ex girlfriend. Pt reports she broke up with him on April 25th and he has been feeling suicidal, hopeless/helpeless, and has had a loss of interest in doing things since then. Pt reports Casey Noel is the only person that knows how I feel. \" pt denied any hx of psych admissions. Pt reports he is supposed to go to [facility] tomorrow for his first appointment. Pt reports he has been trying to get help for awhile and he is upset that he cant go to his appointment tomorrow. Pt reports he has been having suicidal ideations several days a week, that he can sometimes control, since his girlfriend broke up with him. pt denied any hx of suicide attempts or self injurious behaviors. Pt denied abuse hx. Pt reports someone he went to school with committed suicide a few weeks ago, pt denied being close with this individual.     Pt reports drinking a bottle of jayashree's daily. Pt reports smoking at least a gram of marijuana daily. Pt reports \"occasional\" ectasy use. Pt denied any hx of substance abuse rehab, stated he may be interested in going to substance abuse rehab at discharge. Pt reports AOD runs in his family but would not elaborate further. Pt denied legal hx. However, pt became tearful while talking about his ex girlfriend getting a protection order against him on May 5th. Pt tearful stating \"she hates me\" and \"im sorry. 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 Pt reports a 15 yo was picking on a 15 yo and he got involved. Pt reports he became angry and when he gets angry he \"goes to a different level upstairs. \" pt reports he 'choked out' the 15 yo. Pt stated he doesn't believe it was as bad as the kid is saying it was because the kid is a liar. Pt reports recent weight loss, loss of food intake/appetitie, and difficulty sleeping. Pt is employed, graduated from [employer], and completed some college.      Risk Factors: substance use, recent breakup with girlfriend, trouble with the law, recent job loss     Protective Factors: strong family support, help-seeking behavior, safe/stable housing, access to essential needs, employment    Gender  [x] Male [] Female [] Transgender  [] Other    Sexual Orientation    [x] Heterosexual [] Homosexual [] Bisexual [] Other    Suicidal Ideation  [x] Past [] Present [] Denies     C-SSRS Screening Completed: Current Suicide Risk:  [] None  [] Low [] Moderate [x] High    Homicidal Ideation  [x] Past [] Present [] Denies     Hallucinations/Delusions (Specify type)  [] Reports [x] Denies     Current or Past Mental Health Treatment:  [x] Yes, When and Where: pt reports he is going to start treatment with [facility]  [] No    Substance Use/Alcohol Use/Addiction  [x] Reports [] Denies     Tobacco Use (within the last 6 months)  [x] Reports [] Denies     Trauma History  [] Reports [x] Denies     Self Injurious/Self Mutilation Behaviors:   [] Reports:    [] Past [] Present   [x] Denies    Legal History:  [x]  Yes (Specify)  Order of protection   [] No    Collateral Contact (SHERRY signed) pt denied  Name:   Relationship:  Number:     Collateral Information:      Access to Weapons per Collateral Contact: [] Reports [] Denies     After consideration of C-SSRS screening results, C-SSRS assessments, and this professional's assessment the patient's overall suicide risk assessed to be:  [] None   [] Low   [x] Moderate   [] High     [x] Discussed current suicide risk, protective and risk factors with RN and NP/Psychiatrist.    Discharge Plan:  [x] Home: reports he lives alone   [] Shelter:  [] Crisis Unit:  [] Substance Abuse Rehab:  [] Nursing Facility:  [] Other (Specify):     Follow up Provider: [facility]

## 2022-05-16 NOTE — PLAN OF CARE
Problem: Behavior  Goal: Pt/Family maintain appropriate behavior and adhere to behavioral management agreement, if implemented  Description: INTERVENTIONS:  1. Assess patient/family's coping skills and  non-compliant behavior (including use of illegal substances)  2. Notify security of behavior or suspected illegal substances which indicate the need for search of the patient and/or belongings  3. Encourage verbalization of thoughts and concerns in a socially appropriate manner  4. Utilize positive, consistent limit setting strategies supporting safety of patient, staff and others  5. Encourage participation in the decision making process about the behavioral management agreement  6. Implement a Health Care Agreement if patient meets criteria  7. If a patient's behavior jeopardizes the safety of the patient, staff, or others refer to organization policy. If a visitor's behavior poses a threat to safety call refer to organization policy. 8. Initiate consult with , Psychosocial CNS, Spiritual Care as appropriate  Outcome: Progressing     Problem: Coping  Goal: Pt/Family able to verbalize concerns and demonstrate effective coping strategies  Description: INTERVENTIONS:  1. Assist patient/family to identify coping skills, available support systems and cultural and spiritual values  2. Provide emotional support, including active listening and acknowledgement of concerns of patient and caregivers  3. Reduce environmental stimuli, as able  4. Instruct patient/family in relaxation techniques, as appropriate  5. Assess for spiritual pain/suffering and initiate Spiritual Care, Psychosocial Clinical Specialist consults as needed  Outcome: Progressing     Problem: Involuntary Admit  Goal: Will cooperate with staff recommendations and doctor's orders and will demonstrate appropriate behavior  Description: INTERVENTIONS:  1. Treat underlying conditions and offer medication as ordered  2.  Educate regarding involuntary admission procedures and rules  3.  Contain excessive/inappropriate behavior per unit and hospital policies  Outcome: Progressing

## 2022-05-16 NOTE — PROGRESS NOTES
Comprehensive Nutrition Assessment    Type and Reason for Visit:  Initial,Positive Nutrition Screen    Nutrition Recommendations/Plan:   1. Continue current diet  2. Pt refused ONS at this time  3. Will follow     Malnutrition Assessment:  Malnutrition Status: At risk for malnutrition (Comment) (05/16/22 7326)    Context:  Social/Environmental Circumstances     Findings of the 6 clinical characteristics of malnutrition:  Energy Intake:  Unable to assess (no intakes per EMR; pt reports fair intake)  Weight Loss:  Unable to assess (lack of wt hx per EMR)     Body Fat Loss:  No significant body fat loss     Muscle Mass Loss:  No significant muscle mass loss    Fluid Accumulation:  No significant fluid accumulation     Strength:  Not Performed    Nutrition Assessment:    pt adm d/t self-inficted wound (laceration of finger) and Suicidal ideation; pt + for cannabinoids; pt w/ subjective wt loss and poor PO d/t breakup w/ GF of 5 years; per discussion w/ pt he says his appetite is fair and that he does not want ONS on trays; UTO updated wt as pt sleeping/tired when I was in room but he tells me his current wt \"as of two days ago\" is 185#; pt w/ no PMhx on file ; will follow. Nutrition Related Findings:    Alert; oriented to person; GIBRAN I/O; abd WNL; no edema Wound Type: None       Current Nutrition Intake & Therapies:    Average Meal Intake: Unable to assess (per pt he is eating \"okay\")  Average Supplements Intake: Refusing to take,None Ordered (none ordered; pt does not want ONS on trays)  ADULT DIET; Regular    Anthropometric Measures:  Height: 6' 1\" (185.4 cm)  Ideal Body Weight (IBW): 184 lbs (84 kg)    Admission Body Weight: 185 lb (83.9 kg) (5/14-stated)  Current Body Weight: 185 lb (83.9 kg) (5/16-stated), 100.5 % IBW.     Current BMI (kg/m2): 24.4  Usual Body Weight:  (UTO d/t lack of wt hx per EMR)     Weight Adjustment For: No Adjustment                 BMI Categories: Normal Weight (BMI 22.0 to 24.9) age over 65    Estimated Daily Nutrient Needs:  Energy Requirements Based On: Formula     Energy (kcal/day): 1370-8380  Weight Used for Protein Requirements: Current  Protein (g/day): 1.2-1.4g/trgGWH=234-517r  Method Used for Fluid Requirements: 1 ml/kcal  Fluid (ml/day): 3755-4355    Nutrition Diagnosis:   No nutrition diagnosis at this time (d/t limited data per EMR)     Nutrition Interventions:   Food and/or Nutrient Delivery: Continue Current Diet  Nutrition Education/Counseling: No recommendation at this time  Coordination of Nutrition Care: Continue to monitor while inpatient       Goals:     Goals: by next RD assessment,PO intake 50% or greater       Nutrition Monitoring and Evaluation:   Behavioral-Environmental Outcomes: None Identified  Food/Nutrient Intake Outcomes: Food and Nutrient Intake  Physical Signs/Symptoms Outcomes: Biochemical Data,Nutrition Focused Physical Findings,Skin,Weight,Chewing or Swallowing,GI Status,Fluid Status or Edema    Discharge Planning:    No discharge needs at this time     Devendra Keller RD  Contact: [phone]

## 2022-05-16 NOTE — H&P
Department of Psychiatry  History and Physical - Adult       Patient personally seen and examined by me and mental status exam performed. I agree the below assessment by the medical student. I was unable to perform a full an accurate mental status examination as patient is hyperverbal rapid pressured seems paranoid he is misinterpreting he is very focused on the restraining order that his ex-girlfriend has against him. He is easily agitated and impulsive        CHIEF COMPLAINT:  Per chart review, SI with plan    Patient was seen after discussing with the treatment team and reviewing the chart    CIRCUMSTANCES OF ADMISSION: Patient pink-slipped by ED physician for SI with plan. HISTORY OF PRESENT ILLNESS:      The patient is a 32 y.o. single, employed, living alone,  [place] male, with no significant past history of suicide attempt, psychiatric hospitalization, or outpatient treatment, who presented to the ED after sustaining a self-inflected laceration to the left fifth finger and SI. Patient reported to staff he cut his finger with a pocket knife on purpose to \"hurt himself\". His aunt reported to nursing staff that the patient had also used ecstasy prior to arrival. While in the ED, the patient was agitated, crying, yelling at times, punched a wall, and was not directable. Family was at bedside to provide support. ED workup showed drug screen was positive for cannabinoids, EtOH level and acetaminophen levels were undetectable, and EKG showed QTc 396. Patient was medically cleared and brought to [facility] for further medical evaluation and stabilization. Per chart review, patient's SI began after his girlfriend of 5 years ended their relationship on April 25, 2022. He has an appointment to see [facility] next week. He bought rope recently, watched an online video of how to tie a noose, and put it around his neck. He then removed it and put it in a closet at home.  Since the end of his relationship, he has been \"doing not good things\" to his previous girlfriend such as calling her excessively, following her, etc. She has now gotten protection order from the court. He also reports he has homicidal ideation to any male that he sees with his ex-girlfriend. Per ED social work, patient was anxious, tearful at times, irritable, defensive, and irritable, and had pressured speech. In the ED, patient denied AVH. Since admission to [facility], patient flipped over a table and told staff of [facility] that he does not need to be here and he plans to [facility] a run for it\". Upon assessment today, patient was supposedly sleeping. Patient would not awaken with loud knocking and calling out his name. Further evaluation could not be completed. Past Psychiatric History: Per chart review, no past psychiatric hospitalizations. No past sucicide attempts. No overdoses. Denied history of self-harm. Has appointment at [facility] next week. Family Psychiatric History: Unable to be obtained due to patient's state. Allergies: Per chart review, NKDA. Substance Abuse: Used ecstasy prior to arrival. Cannabinoids positive on drug screen. Per david review, patient denies tobacco use and was recently drinking \"more\" EtOH. Legal History: Per chart review, recent protection order from ex-girlfriend. Stressors: Unable to be obtained due to patient's state. Personal/Family/Social: Per chart review, patient works at [employer]. Lives alone. Family including parents, aunt, and cousin were seen at bedside in the ED to support the patient. Abuse: Unable to be obtained due to patient's state. Head Trauma: Unable to be obtained due to patient's state. Gun: Per chart review, patient does not have access to guns. Past Medical History:    History reviewed. No pertinent past medical history. Medications Prior to Admission:   No medications prior to admission. Past Surgical History:    History reviewed.  No pertinent surgical history. Allergies:   Patient has no known allergies. Family History  History reviewed. No pertinent family history. EXAMINATION:    REVIEW OF SYSTEMS:    ROS:  [x] All negative/unchanged except if checked. Explain positive(checked items) below:  [] Constitutional  [] Eyes  [] Ear/Nose/Mouth/Throat  [] Respiratory  [] CV  [] GI  []   [] Musculoskeletal  [] Skin/Breast  [] Neurological  [] Endocrine  [] Heme/Lymph  [] Allergic/Immunologic    Explanation:     Vitals:  /72   Pulse 95   Temp 98.1 °F (36.7 °C) (Infrared)   Resp 16   Ht 6' 1\" (1.854 m)   Wt 185 lb (83.9 kg)   SpO2 99%   BMI 24.41 kg/m²      Physical Examination:   Head: x  Atraumatic: x normocephalic  Skin and Mucosa        Moist x  Dry   Pale  x Normal   Neck:  Thyroid  Palpable   x  Not palpable   venus distention   adenopathy   Chest: x Clear   Rhonchi     Wheezing   CV:  xS1   xS2    xNo murmer   Abdomen:  x  Soft    Tender    Viceromegaly   Extremities:  x No Edema     Edema     Cranial Nerves Examination:   CN II:   xPupils are reactive to light  Pupils are non reactive to light  CN III, IV, VI:  xNo eye deviation    No diplopia or ptosis   CN V:    xFacial Sensation is intact     Facial Sensation is not intact   CN IIIV:   x Hearing is normal to rubbing fingers   CN IX, X:     xNormal gag reflex and phonation   CN XI:   xShoulder shrug and neck rotation is normal  CNXII:    xTongue is midline no deviation or atrophy    Mental Status Examination:    Mental status exam reveals a 20-year-old male that appears stated age in a hospital gown, lying in bed with decent hygiene and grooming. He was not forthcoming in revealing information as he was sleeping with smooth respirations and supposedly unarousbable to voice and loud noises.     DIAGNOSIS:   Bipolar 1 Manic with Psychotic Features  Cannibus abuse    Will take the biopsychosocial approach to treatment, biologically managed with medication, psychosocially working with the healthcare team and social work to ensure outpatient management plan and all needs met with housing, legal stress, coping, and counseling. Encouraging to go to group. LABS: REVIEWED TODAY:  Recent Labs     05/14/22 2216   WBC 7.3   HGB 14.6        Recent Labs     05/14/22 2216      K 3.3*   CL 96*   CO2 24   BUN 6   CREATININE 1.0   GLUCOSE 88     Recent Labs     05/14/22 2216   BILITOT 0.8   ALKPHOS 63   AST 27   ALT 31     Lab Results   Component Value Date    LABAMPH NOT DETECTED 05/14/2022    BARBSCNU NOT DETECTED 05/14/2022    LABBENZ NOT DETECTED 05/14/2022    LABMETH NOT DETECTED 05/14/2022    OPIATESCREENURINE NOT DETECTED 05/14/2022    PHENCYCLIDINESCREENURINE NOT DETECTED 05/14/2022    ETOH <10 05/14/2022     No results found for: TSH, FREET4  No results found for: LITHIUM  No results found for: VALPROATE, CBMZ  No results found for: LITHIUM, VALPROATE      Radiology No results found. TREATMENT PLAN:  The patient's diagnosis, treatment plan, medication management were formulated after patient was seen directly by the attending physician and myself and all relevant documentation was reviewed. Risks,, benefit, side effects, possible outcomes of the medication and alternatives discussed with the patient and the patient demonstrated understanding. The patient was also educated that the outcome of treatment will depend on medication compliance as directed by the prescribers along with regular follow up, compliance with the labs and other work-up, as clinically indicated. Risk Management: Based on the diagnosis and assessment biopsychosocial treatment model was presented to the patient and was given the opportunity to ask any question. The patient was agreeable to the plan and all the patient's questions were answered to the patient's satisfaction. I discussed with the patient the risk, benefit, alternative and common side effects for the proposed medication treatment.   The patient is consenting to this treatment. Patient's risk factors have been mitigated as he has been admitted to inpatient behavioral health in emotionally supportive environment with every 15 minute safety rounds. Patient has significant protective factors including help seeking behavior, family support,safe and stable housing, access to essential needs, and no access to weapons. He has significant risk factors including untreated mental illness, substance use, and recent end of relationship with associated protective order. The patient is at moderate risk for suicide and low risk for homicide. Collateral Information:  Will obtain collateral information from the family or friends. Will obtain medical records as appropriate from out patient providers  Will consult the hospitalist for a physical exam to rule out any co-morbid physical condition. Patient's diagnosis, treatment plan, medication management was formulated at the end of evaluation and after reviewing relevant documentation. Patient was seen directly by myself and Dr. Darrow Sacks Medications started during this admission :    Depakote 500mg BID for mood stabilization  Risperdal 1 mg twice daily  Plan for Risperdal long-acting shot for psychotic features and risk mitigation    Can discontinue constant observer. Patient is deemed to be moderate risk for suicide based on productive risk factors as well as risk mitigation    Risk Factors:   Substance use  Recent breakup with girlfriend  Trouble with the law  Undiagnosed mental health Dx     Protective Factors:   Strong family Support: Mom, Dad, Aunt  Help seeking behavior  Safe and stable housing  Has access to essential needs  No acces to weapons    Prn Haldol 5mg and Vistaril 50mg q6hr for extreme agitation.   Melatonin as ordered for sleep  Vistaril as ordered for anxiety    Psychotherapy:    Encourage participation in milieu and group therapy  Individual therapy as needed            Behavioral Services  Medicare Certification Upon Admission    I certify that this patient's inpatient psychiatric hospital admission is medically necessary for:    [x] (1) Treatment which could reasonably be expected to improve this patient's condition,       [] (2) Or for diagnostic study;     AND     [x](2) The inpatient psychiatric services are provided while the individual is under the care of a physician and are included in the individualized plan of care.     Estimated length of stay/service 3 to 7 days based on stability    Plan for post-hospital care outpatient psychiatric counseling services    Electronically signed by JOSE Gimenez CNP on 4/06/4025 at 1:08 PM      Electronically signed by Melecio Vidal on 5/16/2022 at 8:38 AM

## 2022-05-16 NOTE — CARE COORDINATION
Sw attempted to complete assessment with pt. Pt reports he needs to wake up first. Sw will try again later.

## 2022-05-16 NOTE — PLAN OF CARE
Problem: Pain  Goal: Verbalizes/displays adequate comfort level or baseline comfort level  5/16/2022 1128 by Nancy Galindo RN  Outcome: Progressing  5/16/2022 0438 by Evelio Palmer RN  Outcome: Progressing

## 2022-05-16 NOTE — BH NOTE
Resting in bed isolating would not respond to assessment questions remains guarded irritable mood emotional support given

## 2022-05-16 NOTE — PROGRESS NOTES
Patient was isolative to room, easily awake. Denies suicidal,homicidal or AV hallucinations. Mumbled about girl friend, but did not go into detail. Compliant with medications. Did not attend any groups. Q 15 minutes continue.

## 2022-05-17 PROCEDURE — 6370000000 HC RX 637 (ALT 250 FOR IP): Performed by: PSYCHIATRY & NEUROLOGY

## 2022-05-17 PROCEDURE — 99232 SBSQ HOSP IP/OBS MODERATE 35: CPT | Performed by: NURSE PRACTITIONER

## 2022-05-17 PROCEDURE — 1240000000 HC EMOTIONAL WELLNESS R&B

## 2022-05-17 PROCEDURE — 6370000000 HC RX 637 (ALT 250 FOR IP): Performed by: NURSE PRACTITIONER

## 2022-05-17 RX ORDER — RISPERIDONE 2 MG/1
2 TABLET, FILM COATED ORAL 2 TIMES DAILY
Status: DISCONTINUED | OUTPATIENT
Start: 2022-05-17 | End: 2022-05-20 | Stop reason: HOSPADM

## 2022-05-17 RX ADMIN — DIVALPROEX SODIUM 500 MG: 500 TABLET, DELAYED RELEASE ORAL at 11:00

## 2022-05-17 RX ADMIN — HYDROXYZINE PAMOATE 50 MG: 50 CAPSULE ORAL at 05:56

## 2022-05-17 RX ADMIN — Medication 3 MG: at 20:56

## 2022-05-17 RX ADMIN — DIVALPROEX SODIUM 500 MG: 500 TABLET, DELAYED RELEASE ORAL at 20:56

## 2022-05-17 RX ADMIN — RISPERIDONE 1 MG: 1 TABLET ORAL at 11:00

## 2022-05-17 RX ADMIN — RISPERIDONE 2 MG: 2 TABLET ORAL at 20:56

## 2022-05-17 ASSESSMENT — PAIN SCALES - GENERAL
PAINLEVEL_OUTOF10: 0

## 2022-05-17 NOTE — GROUP NOTE
Group Therapy Note    Date: 5/17/2022    Group Start Time: 1010  Group End Time: 1050  Group Topic: Psychoeducation    [facility] [address]        Group Therapy Note    Attendees: 10       Notes: Active and engaged during discussion on goal planning. Able to identify focus and possible obstacles. Status After Intervention:  Improved    Participation Level:  Active Listener and Interactive    Participation Quality: Appropriate and Attentive      Speech:  normal      Thought Process/Content: Logical      Affective Functioning: Congruent      Mood: euthymic      Level of consciousness:  Alert and Attentive      Response to Learning: Progressing to goal      Endings: None Reported    Modes of Intervention: Education, Support, Socialization and Exploration      Discipline Responsible: Psychoeducational Specialist      Signature:  Angie Avila, [address]

## 2022-05-17 NOTE — PROGRESS NOTES
BEHAVIORAL HEALTH FOLLOW-UP NOTE     5/17/2022     Patient was seen and examined in person, Chart reviewed   Patient's case discussed with staff/team    Chief Complaint: Paranoid misinterpreting    Interim History: Patient seen in his room this morning he is evasive guarded seems paranoid does not answer questions directly. Has very poor limited insight and judgment regarding circumstance of hospitalization need for treatment he misinterprets he seems easily agitated and refused to talk to staff yesterday. Appetite:   [x] Normal/Unchanged  [] Increased  [] Decreased      Sleep:       [x] Normal/Unchanged  [] Fair       [] Poor              Energy:    [x] Normal/Unchanged  [] Increased  [] Decreased        SI [] Present  [x] Absent    HI  []Present  [x] Absent     Aggression:  [] yes  [x] no    Patient is [x] able  [] unable to CONTRACT FOR SAFETY     PAST MEDICAL/PSYCHIATRIC HISTORY:   History reviewed. No pertinent past medical history. FAMILY/SOCIAL HISTORY:  History reviewed. No pertinent family history. Social History     Socioeconomic History    Marital status: Single     Spouse name: Not on file    Number of children: Not on file    Years of education: Not on file    Highest education level: Not on file   Occupational History    Not on file   Tobacco Use    Smoking status: Never Smoker    Smokeless tobacco: Never Used   Vaping Use    Vaping Use: Every day    Substances: Nicotine, THC, Flavoring    Devices: Disposable    Passive vaping exposure: Yes   Substance and Sexual Activity    Alcohol use: Yes     Comment: Drinks liquor daily.      Drug use: Yes     Types: Marijuana Yue Klein)    Sexual activity: Not on file   Other Topics Concern    Not on file   Social History Narrative    Not on file     Social Determinants of Health     Financial Resource Strain:     Difficulty of Paying Living Expenses: Not on file   Food Insecurity:     Worried About Running Out of Food in the Last Year: Not on file    [address] in the Last Year: Not on file   Transportation Needs:     Lack of Transportation (Medical): Not on file    Lack of Transportation (Non-Medical): Not on file   Physical Activity:     Days of Exercise per Week: Not on file    Minutes of Exercise per Session: Not on file   Stress:     Feeling of Stress : Not on file   Social Connections:     Frequency of Communication with Friends and Family: Not on file    Frequency of Social Gatherings with Friends and Family: Not on file    Attends Voodoo Services: Not on file    Active Member of [address] [place] [address] [employer] or Organizations: Not on file    Attends Club or Organization Meetings: Not on file    Marital Status: Not on file   Intimate Partner Violence:     Fear of Current or Ex-Partner: Not on file    Emotionally Abused: Not on file    Physically Abused: Not on file    Sexually Abused: Not on file   Housing Stability:     Unable to Pay for Housing in the Last Year: Not on file    Number of [place] in the Last Year: Not on file    Unstable Housing in the Last Year: Not on file           ROS:  [x] All negative/unchanged except if checked.  Explain positive(checked items) below:  [] Constitutional  [] Eyes  [] Ear/Nose/Mouth/Throat  [] Respiratory  [] CV  [] GI  []   [] Musculoskeletal  [] Skin/Breast  [] Neurological  [] Endocrine  [] Heme/Lymph  [] Allergic/Immunologic    Explanation:     MEDICATIONS:    Current Facility-Administered Medications:     risperiDONE (RISPERDAL) tablet 1 mg, 1 mg, Oral, BID, Gagandeep Landaverde, APRN - CNP, 1 mg at 05/17/22 1100    acetaminophen (TYLENOL) tablet 650 mg, 650 mg, Oral, Q6H PRN, Tripp Lama MD    magnesium hydroxide (MILK OF MAGNESIA) 400 MG/5ML suspension 30 mL, 30 mL, Oral, Daily PRN, Tripp Lama MD    nicotine (NICODERM CQ) 21 MG/24HR 1 patch, 1 patch, TransDERmal, Daily, Tripp Lama MD, 1 patch at 05/17/22 1059    aluminum & magnesium hydroxide-simethicone (MAALOX) 261-216-98 MG/5ML suspension 30 mL, 30 mL, Oral, PRN, Heather Otero MD    hydrOXYzine (VISTARIL) capsule 50 mg, 50 mg, Oral, TID PRN, Heather Otero MD, 50 mg at 05/17/22 0556    haloperidol (HALDOL) tablet 5 mg, 5 mg, Oral, Q6H PRN **OR** haloperidol lactate (HALDOL) injection 5 mg, 5 mg, IntraMUSCular, Q6H PRN, Heather Otero MD, 5 mg at 05/15/22 1922    melatonin tablet 3 mg, 3 mg, Oral, Nightly, Heather Otero MD, 3 mg at 05/16/22 2029    diphenhydrAMINE (BENADRYL) injection 50 mg, 50 mg, IntraMUSCular, Q6H PRN, Heather Otero MD, 50 mg at 05/15/22 1922    LORazepam (ATIVAN) injection 2 mg, 2 mg, IntraVENous, Q6H PRN, Heather Otero MD, 2 mg at 05/15/22 1922    divalproex (DEPAKOTE) DR tablet 500 mg, 500 mg, Oral, BID, Heather Otero MD, 500 mg at 05/17/22 1100    diptheria-tetanus toxoids (DECAVAC) 2-2 LF/0.5ML injection 0.5 mL, 0.5 mL, IntraMUSCular, Once, JAYLEN Hill      Examination:  /78   Pulse 89   Temp 97 °F (36.1 °C) (Temporal)   Resp 16   Ht 6' 1\" (1.854 m)   Wt 185 lb (83.9 kg)   SpO2 99%   BMI 24.41 kg/m²   Gait - steady  Medication side effects(SE): denies    Mental Status Examination:    Level of consciousness:  within normal limits   Appearance:  fair grooming and fair hygiene  Behavior/Motor:  no abnormalities noted  Attitude toward examiner:  cooperative  Speech:  spontaneous, normal rate and normal volume   Mood: \" I am okay. \"  Affect: Flat and blunted  Thought processes: Circumstantial  Thought content: Parted paranoid denies auditory visualizations denies SI/HI intent or plan  Cognition:  oriented to person, place, and time   Concentration intact  Insight poor   Judgement poor     ASSESSMENT:   Patient symptoms are:  [] Well controlled  [x] Improving  [] Worsening  [] No change      Diagnosis:  Principal Problem:    Manic behavior ([facility]  Active Problems:    Severe manic bipolar 1 disorder with psychotic behavior ([facility]  Resolved Problems:    * No resolved hospital problems. *      LABS:    Recent Labs     05/14/22 2216   WBC 7.3   HGB 14.6        Recent Labs     05/14/22 2216      K 3.3*   CL 96*   CO2 24   BUN 6   CREATININE 1.0   GLUCOSE 88     Recent Labs     05/14/22 2216   BILITOT 0.8   ALKPHOS 63   AST 27   ALT 31     Lab Results   Component Value Date    LABAMPH NOT DETECTED 05/14/2022    BARBSCNU NOT DETECTED 05/14/2022    LABBENZ NOT DETECTED 05/14/2022    LABMETH NOT DETECTED 05/14/2022    OPIATESCREENURINE NOT DETECTED 05/14/2022    PHENCYCLIDINESCREENURINE NOT DETECTED 05/14/2022    ETOH <10 05/14/2022     No results found for: TSH, FREET4  No results found for: LITHIUM  No results found for: VALPROATE, CBMZ      Treatment Plan:  Reviewed current Medications with the patient. Risks, benefits, side effects, drug-to-drug interactions and alternatives to treatment were discussed. Collateral information:   CD evaluation  Encourage patient to attend group and other milieu activities.   Discharge planning discussed with the patient and treatment team.    Depakote 500 mg twice daily for mood stabilization  Increase Resporal 2 mg twice daily    PSYCHOTHERAPY/COUNSELING:  [x] Therapeutic interview  [x] Supportive  [] CBT  [] Ongoing  [] Other    [x] Patient continues to need, on a daily basis, active treatment furnished directly by or requiring the supervision of inpatient psychiatric personnel      Anticipated Length of stay: 3 to 7 days based on stability            Electronically signed by JOSE Kelly CNP on 7/93/6452 at 1:37 PM

## 2022-05-17 NOTE — CARE COORDINATION
Deandre spoke with pt mom James Harper. She reports pt does not have any previous psych hx and has not been on medications before. aJmes Harper did not notice any changes in pt recently. She reports pt has been depressed over his breakup with his girlfriend and their 15 yo dog recently passed away. She reports pt reportedly told her sister and her mom that he was having thoughts of harming himself. Jmaes Harper was shocked when she heard this. She reports pt is normally quiet, reserved to himself, and does not often express his feelings. James Harper thinks part of pt problem is him keeping everything bottled up. She reports pt does not have any hx of self injurious behaviors or suicide attempts. James Harper reports pt told her sister that he took 2 ectasy pills. She reports pt told the ED Sw that he looked up on youtube how to hang himself. Reportedly pt bought a rope, made a noose, and was going to hang himself but then someone showed up at this apartment. James Harper did go to pt apartment and she did find the noose in his closet. James Harper is very concerned for pt at this time. She reports pt is not dealing with the breakup at all. She reports they have been off and on and pt ex has severe mental health issues. James Harper reports pt ex girlfriend changed her phone number, blocked him, and wants nothing to do with him. James Harper asked about pt treatment plan for this admission and the plan for discharge. James Harper reports pt does not have access to any guns or other weapons. James Harper is concerned with pt safety at this time because she did find the rope and he did express suicidal ideations to her sister and mom. James Harper reports pt stated she doesn't like to be alone at night, stated he has a lot of family members he can stay with if he wanted. James Harper talked with pt briefly yesterday. She reports he sounded like himself, was crying about wanting to come home, and kept talking about his ex girlfriend.  James Harper is unsure if this is all a call out for help because pt was there for her ex when she was admitted psychiatrically and now he expects her to be here for him. Versa Likes asked about groups on the unit. She would like to be updated on pt treatment.

## 2022-05-17 NOTE — CARE COORDINATION
SABINO called [facility] and spoke with checo who stated that the pt had an appointment scheduled yesterday but BRIAN was aware that the pt was in the hospital. Pt has no upcoming appointments at this time.
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
## 2022-05-17 NOTE — PLAN OF CARE
Problem: Behavior  Goal: Pt/Family maintain appropriate behavior and adhere to behavioral management agreement, if implemented  Description: INTERVENTIONS:  1. Assess patient/family's coping skills and  non-compliant behavior (including use of illegal substances)  2. Notify security of behavior or suspected illegal substances which indicate the need for search of the patient and/or belongings  3. Encourage verbalization of thoughts and concerns in a socially appropriate manner  4. Utilize positive, consistent limit setting strategies supporting safety of patient, staff and others  5. Encourage participation in the decision making process about the behavioral management agreement  6. Implement a Health Care Agreement if patient meets criteria  7. If a patient's behavior jeopardizes the safety of the patient, staff, or others refer to organization policy. If a visitor's behavior poses a threat to safety call refer to organization policy. 8. Initiate consult with , Psychosocial CNS, Spiritual Care as appropriate  Outcome: Progressing     Problem: Anxiety  Goal: Will report anxiety at manageable levels  Description: INTERVENTIONS:  1. Administer medication as ordered  2. Teach and rehearse alternative coping skills  3. Provide emotional support with 1:1 interaction with staff  Outcome: Progressing     Problem: Pain  Goal: Verbalizes/displays adequate comfort level or baseline comfort level  Outcome: Progressing     Problem: Coping  Goal: Pt/Family able to verbalize concerns and demonstrate effective coping strategies  Description: INTERVENTIONS:  1. Assist patient/family to identify coping skills, available support systems and cultural and spiritual values  2. Provide emotional support, including active listening and acknowledgement of concerns of patient and caregivers  3. Reduce environmental stimuli, as able  4. Instruct patient/family in relaxation techniques, as appropriate  5.  Assess for spiritual pain/suffering and initiate Spiritual Care, Psychosocial Clinical Specialist consults as needed  Outcome: Progressing

## 2022-05-17 NOTE — CARE COORDINATION
SW received a call from pt's mom Cyndee Christianson stating that this pt signed an SHERRY for her, SW looked in pt's soft chart and there was no SHERRY signed, SABINO spoke with RN that stated there is no SHERRY signed. SABINO met with pt who signed SHERRY for panfilo Christianson [phone] and gave verbal permission to give mom an update on his treatment. SW will place SHERRY in soft chart. SABINO transferred phone call to assigned SW to obtain more information and provide update on the pt.

## 2022-05-18 PROCEDURE — 1240000000 HC EMOTIONAL WELLNESS R&B

## 2022-05-18 PROCEDURE — 6370000000 HC RX 637 (ALT 250 FOR IP): Performed by: PSYCHIATRY & NEUROLOGY

## 2022-05-18 PROCEDURE — 99232 SBSQ HOSP IP/OBS MODERATE 35: CPT | Performed by: NURSE PRACTITIONER

## 2022-05-18 PROCEDURE — 6370000000 HC RX 637 (ALT 250 FOR IP): Performed by: NURSE PRACTITIONER

## 2022-05-18 RX ADMIN — Medication 3 MG: at 20:46

## 2022-05-18 RX ADMIN — RISPERIDONE 2 MG: 2 TABLET ORAL at 20:46

## 2022-05-18 RX ADMIN — DIVALPROEX SODIUM 500 MG: 500 TABLET, DELAYED RELEASE ORAL at 20:46

## 2022-05-18 RX ADMIN — DIVALPROEX SODIUM 500 MG: 500 TABLET, DELAYED RELEASE ORAL at 08:26

## 2022-05-18 RX ADMIN — HYDROXYZINE PAMOATE 50 MG: 50 CAPSULE ORAL at 15:34

## 2022-05-18 RX ADMIN — RISPERIDONE 2 MG: 2 TABLET ORAL at 08:26

## 2022-05-18 ASSESSMENT — PAIN SCALES - GENERAL: PAINLEVEL_OUTOF10: 0

## 2022-05-18 NOTE — PROGRESS NOTES
Patient attended afternoon meet and greet. Updated on staffing and evening expectations. Participated in state of [place] trivia.

## 2022-05-18 NOTE — PROGRESS NOTES
Patient denies Yarinishant Cheng at this time. He is more organized in his thoughts, calm and cooperative during assessment. He signed his voluntary form. Takes his meds and attends group. Will monitor.

## 2022-05-18 NOTE — BH NOTE
Resting in bed isolative currently no c/o emotional support given has racing thoughts and FOI's noted circumstantial avoidant of issues emotional support given

## 2022-05-18 NOTE — PROGRESS NOTES
Attended morning community meeting. Updated on staffing and daily expectations. Shared goal for the day as to eat all my meals.

## 2022-05-18 NOTE — PLAN OF CARE
[facility]  Day 3 Interdisciplinary Treatment Plan NOTE    Review Date & Time: 5/18/2022    10:33 AM      Patient was in treatment team    Estimated Length of Stay Update:   7 days  Estimated Discharge Date Update:  5/22/22    EDUCATION:   Learner Progress Toward Treatment Goals: Reviewed results and recommendations of this team    Method: Group    Outcome: Verbalized understanding    PATIENT GOALS:  'eat all my meals\"    PLAN/TREATMENT RECOMMENDATIONS UPDATE: encourage daily goals and groups    GOALS UPDATE:   Time frame for Short-Term Goals:  By discharge      Marlene Mcbride RN

## 2022-05-18 NOTE — GROUP NOTE
Group Therapy Note    Date: 5/18/2022    Group Start Time: 1100  Group End Time: 1150  Group Topic: Psychotherapy    [facility] ACUTE [facility] 1    BENJIE Davis LSW        Group Therapy Note    Attendees: 5         Patient's Goal:  To increase social interaction and improve relationships with others. Notes: Pt was attentive in group and was able to identify an agenda. he was also able to verbalize relating to others within the group. Status After Intervention:  Improved    Participation Level:  Active Listener and Interactive    Participation Quality: Appropriate, Attentive, Sharing and Supportive      Speech:  normal      Thought Process/Content: Logical      Affective Functioning: Congruent      Mood: anxious      Level of consciousness:  Alert and Oriented x4      Response to Learning: Able to verbalize current knowledge/experience      Endings: None Reported    Modes of Intervention: Support, Socialization and Exploration      Discipline Responsible: /Counselor      Signature:  BENJIE Rodrigues, LIZZ
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
## 2022-05-19 LAB — VALPROIC ACID LEVEL: 75 MCG/ML (ref 50–100)

## 2022-05-19 PROCEDURE — 36415 COLL VENOUS BLD VENIPUNCTURE: CPT

## 2022-05-19 PROCEDURE — 6370000000 HC RX 637 (ALT 250 FOR IP): Performed by: NURSE PRACTITIONER

## 2022-05-19 PROCEDURE — 80164 ASSAY DIPROPYLACETIC ACD TOT: CPT

## 2022-05-19 PROCEDURE — 6370000000 HC RX 637 (ALT 250 FOR IP): Performed by: PSYCHIATRY & NEUROLOGY

## 2022-05-19 PROCEDURE — 99232 SBSQ HOSP IP/OBS MODERATE 35: CPT | Performed by: NURSE PRACTITIONER

## 2022-05-19 PROCEDURE — 1240000000 HC EMOTIONAL WELLNESS R&B

## 2022-05-19 RX ORDER — NICOTINE 21 MG/24HR
1 PATCH, TRANSDERMAL 24 HOURS TRANSDERMAL DAILY
Qty: 30 PATCH | Refills: 0
Start: 2022-05-20 | End: 2022-06-19

## 2022-05-19 RX ORDER — RISPERIDONE 2 MG/1
2 TABLET, FILM COATED ORAL 2 TIMES DAILY
Qty: 60 TABLET | Refills: 0 | Status: SHIPPED | OUTPATIENT
Start: 2022-05-19 | End: 2022-06-18

## 2022-05-19 RX ORDER — DIVALPROEX SODIUM 500 MG/1
500 TABLET, DELAYED RELEASE ORAL 2 TIMES DAILY
Qty: 60 TABLET | Refills: 0 | Status: SHIPPED | OUTPATIENT
Start: 2022-05-19 | End: 2022-06-18

## 2022-05-19 RX ADMIN — RISPERIDONE 2 MG: 2 TABLET ORAL at 20:41

## 2022-05-19 RX ADMIN — HYDROXYZINE PAMOATE 50 MG: 50 CAPSULE ORAL at 15:24

## 2022-05-19 RX ADMIN — DIVALPROEX SODIUM 500 MG: 500 TABLET, DELAYED RELEASE ORAL at 09:42

## 2022-05-19 RX ADMIN — RISPERIDONE 2 MG: 2 TABLET ORAL at 09:42

## 2022-05-19 RX ADMIN — DIVALPROEX SODIUM 500 MG: 500 TABLET, DELAYED RELEASE ORAL at 20:41

## 2022-05-19 RX ADMIN — Medication 3 MG: at 20:41

## 2022-05-19 ASSESSMENT — PAIN SCALES - GENERAL
PAINLEVEL_OUTOF10: 0

## 2022-05-19 ASSESSMENT — PAIN SCALES - WONG BAKER: WONGBAKER_NUMERICALRESPONSE: 0
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
## 2022-05-19 NOTE — PROGRESS NOTES
CLINICAL PHARMACY NOTE: MEDS TO BEDS    Total # of Prescriptions Filled: 2   The following medications were delivered to the patient:  · Divalproex sodium 500 mg  · Risperidone 2 mg  · Delivered to [facility]    Additional Documentation:

## 2022-05-19 NOTE — PROGRESS NOTES
Spiritual Support Group Note    Number of Participants in Group:     12                   Time:   2    Goal: Relief from isolation and loneliness             Ally Sharing             Self-understanding and gain insight              Acceptance and belonging            Recognize they are not alone                Socialization             Empowerment       Encouragement    Topic:  [x] Spiritual Wellness and Self Care                  [x] Hope                     [] Connecting with Divine/Others        [x] Thankfulness and Gratitude               []  Meaningfulness and Purpose               [] Forgiveness               [] Peace               [] Connect to [employer]      [] Other    Participation Level:   [x] Active Listener   [] Minimal   [] Monopolizing   [] Interactive   [] No Participation   []  Other:     Attention:   [x] Alert   [] Distractible   [] Drowsy   [] Poor   [] Other:    Manner:   [x] Cooperative   [] Suspicious   [] Withdrawn   [] Guarded   [] Irritable   [] Inhospitable   [] Other:     Others Comments from Group:

## 2022-05-19 NOTE — GROUP NOTE
Date: 5/19/2022    Group Start Time: 1010  Group End Time: 1050  Group Topic: Psychoeducation    [facility]  [id number] [facility]                                                                        Group Therapy Note    Number of Participants: 14  Type of Group: Psychoeducation    Wellness Binder Information  Module Name: gratitude   Patient's Goal: patient will be able to id steps to increase ones own happiness on a regular basis. Notes: Pleasant and sharing in group. Able to share when prompted. Status After Intervention:  Improved    Participation Level:  Active Listener and Interactive    Participation Quality: Appropriate, Attentive, and Sharing      Speech:  normal       Thought Process/Content: Logical      Affective Functioning: Congruent      Mood: euthymic      Level of consciousness:  Alert, Oriented x4, and Attentive      Response to Learning: Able to verbalize/acknowledge new learning, Able to retain information, and Progressing to goal      Endings: None Reported    Modes of Intervention: Education, Support, Socialization, Exploration, and Problem-solving      Discipline Responsible: Psychoeducational Specialist      Signature:  Pako Montez

## 2022-05-19 NOTE — PROGRESS NOTES
BEHAVIORAL HEALTH FOLLOW-UP NOTE     5/19/2022     Patient was seen and examined in person, Chart reviewed   Patient's case discussed with staff/team    Chief Complaint: \" I am doing really good. \"    Interim History: Patient up on the unit with a very bright pleasant affect. He vehemently denies SI/HI intent or plan he denies any auditory or visual hallucinations he states he is doing \"much better. \"  He believes medications are working well for him. He is appreciative the help that he is received here. Eating well sleeping well no neurovegetative signs symptoms of depression. He is out visible in the milieu social with the nursing students. He is future oriented able to describe his future plans spontaneously richness of detail. States been talking to his mom plans to live with his mother on discharge. Appetite:   [x] Normal/Unchanged  [] Increased  [] Decreased      Sleep:       [x] Normal/Unchanged  [] Fair       [] Poor              Energy:    [x] Normal/Unchanged  [] Increased  [] Decreased        SI [] Present  [x] Absent    HI  []Present  [x] Absent     Aggression:  [] yes  [x] no    Patient is [x] able  [] unable to CONTRACT FOR SAFETY     PAST MEDICAL/PSYCHIATRIC HISTORY:   History reviewed. No pertinent past medical history. FAMILY/SOCIAL HISTORY:  History reviewed. No pertinent family history. Social History     Socioeconomic History    Marital status: Single     Spouse name: Not on file    Number of children: Not on file    Years of education: Not on file    Highest education level: Not on file   Occupational History    Not on file   Tobacco Use    Smoking status: Never Smoker    Smokeless tobacco: Never Used   Vaping Use    Vaping Use: Every day    Substances: Nicotine, THC, Flavoring    Devices: Disposable    Passive vaping exposure: Yes   Substance and Sexual Activity    Alcohol use: Yes     Comment: Drinks liquor daily.      Drug use: Yes     Types: Marijuana Zollie Axe)    Sexual activity: Not on file   Other Topics Concern    Not on file   Social History Narrative    Not on file     Social Determinants of Health     Financial Resource Strain:     Difficulty of Paying Living Expenses: Not on file   Food Insecurity:     Worried About Running Out of Food in the Last Year: Not on file    Ashley of Food in the Last Year: Not on file   Transportation Needs:     Lack of Transportation (Medical): Not on file    Lack of Transportation (Non-Medical): Not on file   Physical Activity:     Days of Exercise per Week: Not on file    Minutes of Exercise per Session: Not on file   Stress:     Feeling of Stress : Not on file   Social Connections:     Frequency of Communication with Friends and Family: Not on file    Frequency of Social Gatherings with Friends and Family: Not on file    Attends Anglican Services: Not on file    Active Member of [address] [place] [address] [employer] or Organizations: Not on file    Attends Club or Organization Meetings: Not on file    Marital Status: Not on file   Intimate Partner Violence:     Fear of Current or Ex-Partner: Not on file    Emotionally Abused: Not on file    Physically Abused: Not on file    Sexually Abused: Not on file   Housing Stability:     Unable to Pay for Housing in the Last Year: Not on file    Number of [place] in the Last Year: Not on file    Unstable Housing in the Last Year: Not on file           ROS:  [x] All negative/unchanged except if checked.  Explain positive(checked items) below:  [] Constitutional  [] Eyes  [] Ear/Nose/Mouth/Throat  [] Respiratory  [] CV  [] GI  []   [] Musculoskeletal  [] Skin/Breast  [] Neurological  [] Endocrine  [] Heme/Lymph  [] Allergic/Immunologic    Explanation:     MEDICATIONS:    Current Facility-Administered Medications:     risperiDONE (RISPERDAL) tablet 2 mg, 2 mg, Oral, BID, JOSE Fay CNP, 2 mg at 05/19/22 0942    acetaminophen (TYLENOL) tablet 650 mg, 650 mg, Oral, Q6H PRN, Ignacio Ear, MD Ardyth Moritz magnesium hydroxide (MILK OF MAGNESIA) 400 MG/5ML suspension 30 mL, 30 mL, Oral, Daily PRN, Kate Marmolejo MD    nicotine (NICODERM CQ) 21 MG/24HR 1 patch, 1 patch, TransDERmal, Daily, Kate Marmolejo MD, 1 patch at 05/19/22 0854    aluminum & magnesium hydroxide-simethicone (MAALOX) 200-200-20 MG/5ML suspension 30 mL, 30 mL, Oral, PRN, Kate Marmolejo MD    hydrOXYzine (VISTARIL) capsule 50 mg, 50 mg, Oral, TID PRN, Kate Marmolejo MD, 50 mg at 05/18/22 1534    haloperidol (HALDOL) tablet 5 mg, 5 mg, Oral, Q6H PRN **OR** haloperidol lactate (HALDOL) injection 5 mg, 5 mg, IntraMUSCular, Q6H PRN, Kate Marmolejo MD, 5 mg at 05/15/22 1922    melatonin tablet 3 mg, 3 mg, Oral, Nightly, Kate Marmolejo MD, 3 mg at 05/18/22 2046    diphenhydrAMINE (BENADRYL) injection 50 mg, 50 mg, IntraMUSCular, Q6H PRN, Kate Marmolejo MD, 50 mg at 05/15/22 1922    LORazepam (ATIVAN) injection 2 mg, 2 mg, IntraVENous, Q6H PRN, Kate Marmolejo MD, 2 mg at 05/15/22 1922    divalproex (DEPAKOTE) DR tablet 500 mg, 500 mg, Oral, BID, Kate Marmolejo MD, 500 mg at 05/19/22 0942    diptheria-tetanus toxoids (DECAVAC) 2-2 LF/0.5ML injection 0.5 mL, 0.5 mL, IntraMUSCular, Once, JAYLEN Gomez      Examination:  BP 92/62   Pulse 55   Temp 98.2 °F (36.8 °C) (Temporal)   Resp 14   Ht 6' 1\" (1.854 m)   Wt 185 lb (83.9 kg)   SpO2 99%   BMI 24.41 kg/m²   Gait - steady  Medication side effects(SE): denies    Mental Status Examination:    Level of consciousness:  within normal limits   Appearance:  fair grooming and fair hygiene  Behavior/Motor:  no abnormalities noted  Attitude toward examiner:  cooperative  Speech:  spontaneous, normal rate and normal volume   Mood: \" I am okay. \"  Affect: Writer pleasant  Thought processes: Linear without flight of ideas loose associations  Thought content: Devoid of any auditory visualizations delusions during the perceptual normalities.   Denies SI/HI intent or plan  Cognition:  oriented to person, place, and time   Concentration intact  Insight improving  Judgement improving    ASSESSMENT:   Patient symptoms are:  [] Well controlled  [x] Improving  [] Worsening  [] No change      Diagnosis:  Principal Problem:    Manic behavior ([facility]  Active Problems:    Severe manic bipolar 1 disorder with psychotic behavior ([facility]  Resolved Problems:    * No resolved hospital problems. *      LABS:    No results for input(s): WBC, HGB, PLT in the last 72 hours. No results for input(s): NA, K, CL, CO2, BUN, CREATININE, GLUCOSE in the last 72 hours. No results for input(s): BILITOT, ALKPHOS, AST, ALT in the last 72 hours. Lab Results   Component Value Date    LABAMPH NOT DETECTED 05/14/2022    BARBSCNU NOT DETECTED 05/14/2022    LABBENZ NOT DETECTED 05/14/2022    LABMETH NOT DETECTED 05/14/2022    OPIATESCREENURINE NOT DETECTED 05/14/2022    PHENCYCLIDINESCREENURINE NOT DETECTED 05/14/2022    ETOH <10 05/14/2022     No results found for: TSH, FREET4  No results found for: LITHIUM  Lab Results   Component Value Date    VALPROATE 75 05/19/2022         Treatment Plan:  Reviewed current Medications with the patient. Risks, benefits, side effects, drug-to-drug interactions and alternatives to treatment were discussed. Collateral information:   CD evaluation  Encourage patient to attend group and other milieu activities.   Discharge planning discussed with the patient and treatment team.    Depakote 500 mg twice daily for mood stabilization  Continue Resporal 2 mg twice daily    PSYCHOTHERAPY/COUNSELING:  [x] Therapeutic interview  [x] Supportive  [] CBT  [] Ongoing  [] Other    [x] Patient continues to need, on a daily basis, active treatment furnished directly by or requiring the supervision of inpatient psychiatric personnel      Anticipated Length of stay: 3 to 7 days based on stability            Electronically signed by JOSE Hood CNP on 8/65/5625 at 11:31 AM

## 2022-05-19 NOTE — BH NOTE
Pt is stable and cooperative, social with peers. Denies suicidal / homicidal ideations. Pt denies hallucinations. Will follow and monitor.

## 2022-05-19 NOTE — PLAN OF CARE
Patient denies suicidal ideation, homicidal ideations and AVH. Patient denies anxiety and depression. Patient is brightened and friendly. Presents calm and cooperative during assessment. Patient is out on the unit and is social with select peers. Medications taken without issue. No complaints or concerns verbalized at this time. No unit problems reported. Will continue to observe and support. Problem: Behavior  Goal: Pt/Family maintain appropriate behavior and adhere to behavioral management agreement, if implemented  Description: INTERVENTIONS:  1. Assess patient/family's coping skills and  non-compliant behavior (including use of illegal substances)  2. Notify security of behavior or suspected illegal substances which indicate the need for search of the patient and/or belongings  3. Encourage verbalization of thoughts and concerns in a socially appropriate manner  4. Utilize positive, consistent limit setting strategies supporting safety of patient, staff and others  5. Encourage participation in the decision making process about the behavioral management agreement  6. Implement a Health Care Agreement if patient meets criteria  7. If a patient's behavior jeopardizes the safety of the patient, staff, or others refer to organization policy. If a visitor's behavior poses a threat to safety call refer to organization policy. 8. Initiate consult with , Psychosocial CNS, Spiritual Care as appropriate  5/18/2022 2200 by Chuck Briceño RN  Outcome: Progressing     Problem: Anxiety  Goal: Will report anxiety at manageable levels  Description: INTERVENTIONS:  1. Administer medication as ordered  2. Teach and rehearse alternative coping skills  3.  Provide emotional support with 1:1 interaction with staff  5/18/2022 2200 by Chuck Briceño RN  Outcome: Progressing

## 2022-05-19 NOTE — PLAN OF CARE
Pt is stable and cooperative. Pt denies suicidal or homicidal ideations. Pt denies hallucinations. Pt is without distress. Will follow and monitor.

## 2022-05-19 NOTE — GROUP NOTE
Group Therapy Note    Date: 5/19/2022    Group Start Time: 1100  Group End Time: 3214  Group Topic: Psychotherapy    [facility]    BENJIE Garcia LSW        Group Therapy Note    Attendees: 7         Patient's Goal:  To increase social interaction and improve relationships with others. Notes:  Patient attended, was able to set an agenda, and participated     Status After Intervention:  Unchanged    Participation Level:  Active Listener and Interactive    Participation Quality: Appropriate, Attentive, Sharing and Supportive      Speech:  normal      Thought Process/Content: Logical  Linear      Affective Functioning: Congruent      Mood: euthymic      Level of consciousness:  Alert, Oriented x4 and Attentive      Response to Learning: Able to verbalize current knowledge/experience      Endings: None Reported    Modes of Intervention: Support, Socialization and Exploration      Discipline Responsible: /Counselor      Signature:  BENJIE Garcia LSW

## 2022-05-19 NOTE — PROGRESS NOTES
Attended morning community meeting. Updated on staffing and daily expectations. Shared goal for the day as to be calm attends groups and take my meds.

## 2022-05-20 VITALS
BODY MASS INDEX: 24.52 KG/M2 | HEART RATE: 58 BPM | TEMPERATURE: 97.8 F | WEIGHT: 185 LBS | DIASTOLIC BLOOD PRESSURE: 57 MMHG | SYSTOLIC BLOOD PRESSURE: 113 MMHG | HEIGHT: 73 IN | RESPIRATION RATE: 14 BRPM | OXYGEN SATURATION: 99 %

## 2022-05-20 PROCEDURE — 6370000000 HC RX 637 (ALT 250 FOR IP): Performed by: NURSE PRACTITIONER

## 2022-05-20 PROCEDURE — 6370000000 HC RX 637 (ALT 250 FOR IP): Performed by: PSYCHIATRY & NEUROLOGY

## 2022-05-20 PROCEDURE — 99239 HOSP IP/OBS DSCHRG MGMT >30: CPT | Performed by: NURSE PRACTITIONER

## 2022-05-20 RX ADMIN — RISPERIDONE 2 MG: 2 TABLET ORAL at 08:41

## 2022-05-20 RX ADMIN — DIVALPROEX SODIUM 500 MG: 500 TABLET, DELAYED RELEASE ORAL at 08:41

## 2022-05-20 RX ADMIN — HYDROXYZINE PAMOATE 50 MG: 50 CAPSULE ORAL at 02:27

## 2022-05-20 NOTE — PROGRESS NOTES
Patient has been out on unit,social with peers. Affect pleasant,cooperative,brightened. Denies SI,HI or AV hallucinations. Verbalizes no anxiety or depression. Appetite is good ,eating all ordered meals. Sleep has improved. Verbalized prior to admission ,\"It was my choice to not sleep. I stayed up and played on my phone. \"  Pinky finger left hand, abrasion well approximated, 2 sutures intact, no S/S of infection. Verbalize since admission thoughts have improved, no longer thinking of ex girl friend. Focused on my self getting better. Sleep has improved, appetite improved, feeling better. Plans to return to work at [employer] on discharge and stay with his mom a couple of weeks. Compliant with medications . Attends groups. Q 15 minute rounds continue.

## 2022-05-20 NOTE — DISCHARGE SUMMARY
DISCHARGE SUMMARY      Patient ID:  Aneudy Coombs  [id number]  32 y.o.  1995    Admit date: 5/14/2022    Discharge date and time: 5/20/2022    Admitting Physician: Moisés Alvares MD     Discharge Physician: Dr Corrina Novak MD    Discharge Diagnoses:   Patient Active Problem List   Diagnosis    Manic behavior ([address]    Severe manic bipolar 1 disorder with psychotic behavior [facility])       Admission Condition: poor    Discharged Condition: stable    Admission Circumstance: Patient pink-slipped by ED physician for SI with plan. PAST MEDICAL/PSYCHIATRIC HISTORY:   History reviewed. No pertinent past medical history. FAMILY/SOCIAL HISTORY:  History reviewed. No pertinent family history. Social History     Socioeconomic History    Marital status: Single     Spouse name: Not on file    Number of children: Not on file    Years of education: Not on file    Highest education level: Not on file   Occupational History    Not on file   Tobacco Use    Smoking status: Never Smoker    Smokeless tobacco: Never Used   Vaping Use    Vaping Use: Every day    Substances: Nicotine, THC, Flavoring    Devices: Disposable    Passive vaping exposure: Yes   Substance and Sexual Activity    Alcohol use: Yes     Comment: Drinks liquor daily.  Drug use: Yes     Types: Marijuana Derrick Bilberry)    Sexual activity: Not on file   Other Topics Concern    Not on file   Social History Narrative    Not on file     Social Determinants of Health     Financial Resource Strain:     Difficulty of Paying Living Expenses: Not on file   Food Insecurity:     Worried About Running Out of Food in the Last Year: Not on file    Ashley of Food in the Last Year: Not on file   Transportation Needs:     Lack of Transportation (Medical): Not on file    Lack of Transportation (Non-Medical):  Not on file   Physical Activity:     Days of Exercise per Week: Not on file    Minutes of Exercise per Session: Not on file   Stress:     Feeling of Stress : Not on file   Social Connections:     Frequency of Communication with Friends and Family: Not on file    Frequency of Social Gatherings with Friends and Family: Not on file    Attends Ep[employer] Services: Not on file    Active Member of Clubs or Organizations: Not on file    Attends Club or Organization Meetings: Not on file    Marital Status: Not on file   Intimate Partner Violence:     Fear of Current or Ex-Partner: Not on file    Emotionally Abused: Not on file    Physically Abused: Not on file    Sexually Abused: Not on file   Housing Stability:     Unable to Pay for Housing in the Last Year: Not on file    Number of [place] in the Last Year: Not on file    Unstable Housing in the Last Year: Not on file       MEDICATIONS:    Current Facility-Administered Medications:     risperiDONE (RISPERDAL) tablet 2 mg, 2 mg, Oral, BID, JOSE Kelly - CNP, 2 mg at 05/20/22 0841    acetaminophen (TYLENOL) tablet 650 mg, 650 mg, Oral, Q6H PRN, Moisés Alvares MD    magnesium hydroxide (MILK OF MAGNESIA) 400 MG/5ML suspension 30 mL, 30 mL, Oral, Daily PRN, Moisés Alvares MD    nicotine (NICODERM CQ) 21 MG/24HR 1 patch, 1 patch, TransDERmal, Daily, Moisés Alvares MD, 1 patch at 05/20/22 0841    aluminum & magnesium hydroxide-simethicone (MAALOX) 200-200-20 MG/5ML suspension 30 mL, 30 mL, Oral, PRN, Moisés Alvares MD    hydrOXYzine (VISTARIL) capsule 50 mg, 50 mg, Oral, TID PRN, Moisés Alvares MD, 50 mg at 05/20/22 0227    haloperidol (HALDOL) tablet 5 mg, 5 mg, Oral, Q6H PRN **OR** haloperidol lactate (HALDOL) injection 5 mg, 5 mg, IntraMUSCular, Q6H PRN, Moisés Alvares MD, 5 mg at 05/15/22 1922    melatonin tablet 3 mg, 3 mg, Oral, Nightly, Moisés Alvares MD, 3 mg at 05/19/22 2041    diphenhydrAMINE (BENADRYL) injection 50 mg, 50 mg, IntraMUSCular, Q6H PRN, Moisés Alvares MD, 50 mg at 05/15/22 1922    LORazepam (ATIVAN) injection 2 mg, 2 mg, IntraVENous, Q6H PRN, Moisés Alvares MD, 2 mg at 05/15/22 1922    divalproex (DEPAKOTE) DR tablet 500 mg, 500 mg, Oral, BID, Terrie Colby MD, 500 mg at 05/20/22 0841    diptheria-tetanus toxoids (DECAVAC) 2-2 LF/0.5ML injection 0.5 mL, 0.5 mL, IntraMUSCular, Once, JAYLEN Matthews    Examination:  BP (!) 113/57   Pulse 58   Temp 97.8 °F (36.6 °C)   Resp 14   Ht 6' 1\" (1.854 m)   Wt 185 lb (83.9 kg)   SpO2 99%   BMI 24.41 kg/m²   Gait - steady    HOSPITAL COURSE[de-identified]     Patient was admitted to the unit on 4/15/2022 was closely monitored for suicidal ideations. He was evaluated was treated with Risperdal 2 mg twice daily and Depakote 500 mg twice daily patient is not agreeable to a long-acting injection stay that he preferred to just take the pills. Medical events were insignificant and patient continued to improve on the floor. He start coming out of his room he is attending groups to socializing with peers. He never made any suicidal statements or any suicidal gestures while in the unit. Social workers obtain collateral information from patient's mother who was able to voicing concerns that she had. She reported no safety concerns no access to any guns reported that she felt patient had to improved substantially. .  Treatment team felt the patient obtain the maximum benefit from his hospitalization he was set up with an outpatient mental health agency for outpatient follow-up services. At the time of discharge patient did not show any impulsive behavior. He was up on the unit he was attending groups and socializing with peers. He vehemently denied any suicidal homicidal ideations intent or plan. He was eating well and sleeping well there are no neurovegetative signs or symptoms of depression he denied any auditory or visual hallucinations. There are no overt or covert signs of psychosis. He was appreciative of the help that he received here. This patient no longer meets criteria for inpatient hospitalization.       No AVH or paranoid thoughts  No hopeless or worthless feeling  No active SI/HI  Appetite:  [x] Normal  [] Increased  [] Decreased    Sleep:       [x] Normal  [] Fair       [] Poor            Energy:    [x] Normal  [] Increased  [] Decreased     SI [] Present  [x] Absent  HI  []Present  [x] Absent   Aggression:  [] yes  [x] no  Patient is [x] able  [] unable to CONTRACT FOR SAFETY   Medication side effects(SE):  [x] None(Psych. Meds.) [] Other      Mental Status Examination on discharge:    Level of consciousness:  within normal limits   Appearance:  well-appearing  Behavior/Motor:  no abnormalities noted  Attitude toward examiner:  attentive and good eye contact  Speech:  spontaneous, normal rate and normal volume   Mood: \" My mood is good. \"  Affect: Bright appropriate and pleasant  Thought processes: Linear without flight of ideas loose associations  Thought content: Devoid of any auditory visualizations delusions or other perceptual normalities. Denies SI/HI intent or plan  Cognition:  oriented to person, place, and time   Concentration intact  Memory intact  Insight good   Judgement fair   Fund of Knowledge adequate      ASSESSMENT:  Patient symptoms are:  [x] Well controlled  [x] Improving  [] Worsening  [] No change    Reason for more than one antipsychotic:  [x] N/A  [] 3 Failed Monotherapy attempts (Drugs tried:)  [] Crossover to a new antipsychotic  [] Taper to Monotherapy from Polypharmacy  [] Augmentation of clozapine therapy due to treatment resistance to single therapy    Diagnosis:  Principal Problem:    Manic behavior ([facility]  Active Problems:    Severe manic bipolar 1 disorder with psychotic behavior ([facility]  Resolved Problems:    * No resolved hospital problems. *      LABS:    No results for input(s): WBC, HGB, PLT in the last 72 hours. No results for input(s): NA, K, CL, CO2, BUN, CREATININE, GLUCOSE in the last 72 hours. No results for input(s): BILITOT, ALKPHOS, AST, ALT in the last 72 hours.   Lab Results Component Value Date    LABAMPH NOT DETECTED 05/14/2022    BARBSCNU NOT DETECTED 05/14/2022    LABBENZ NOT DETECTED 05/14/2022    LABMETH NOT DETECTED 05/14/2022    OPIATESCREENURINE NOT DETECTED 05/14/2022    PHENCYCLIDINESCREENURINE NOT DETECTED 05/14/2022    ETOH <10 05/14/2022     No results found for: TSH, FREET4  No results found for: LITHIUM  Lab Results   Component Value Date    VALPROATE 75 05/19/2022       RISK ASSESSMENT AT DISCHARGE: Low risk for suicide and homicide. Treatment Plan:  Reviewed current Medications with the patient. Education provided on the complaince with treatment. Risks, benefits, side effects, drug-to-drug interactions and alternatives to treatment were discussed. Encourage patient to attend outpatient follow up appointment and therapy. Patient was advised to call the outpatient provider, visit the nearest ED or call 911 if symptoms are not manageable. Patient's family member was contacted prior to the discharge.          Medication List      START taking these medications    divalproex 500 MG DR tablet  Commonly known as: DEPAKOTE  Take 1 tablet by mouth 2 times daily     nicotine 21 MG/24HR  Commonly known as: NICODERM CQ  Place 1 patch onto the skin daily     risperiDONE 2 MG tablet  Commonly known as: RISPERDAL  Take 1 tablet by mouth 2 times daily           Where to Get Your Medications      These medications were sent to [facility], [address] [address]    Phone: [phone]   · divalproex 500 MG DR tablet  · risperiDONE 2 MG tablet     Information about where to get these medications is not yet available    Ask your nurse or doctor about these medications  · nicotine 21 MG/24HR       Patient is counseled if he continues to abuse drugs or alcohol he could act out impulsively causing serious harm to himself or others even though may be unintentional.  He demonstrated understanding of this and has the capacity understand this    Patient is counseled hisr mental health treatment will be difficult to optimize with ongoing use of drugs or alcohol he demonstrate understanding of this as the past understand this     Patient is counseled he must remain compliant with all medications outpatient follow-up ointments    Patient is discharged home in stable condition    TIME SPEND - 35 MINUTES TO COMPLETE THE EVALUATION, DISCHARGE SUMMARY, MEDICATION RECONCILIATION AND FOLLOW UP CARE     Signed:  JOSE Queen - CNP  2/64/0516  11:08 AM

## 2022-05-20 NOTE — PROGRESS NOTES
Attended morning community meeting. Updated on staffing and daily expectations. Shared goal for the day as to relax and stay patient.

## 2022-05-20 NOTE — DISCHARGE INSTR - COC
Continuity of Care Form    Patient Name: Casey Barnhart   :  1995  MRN:  [id number]    Admit date:  2022  Discharge date:  22    Code Status Order: Full Code   Advance Directives:      Admitting Physician:  Tenisha Ferris MD  PCP: Abhay Deshpande MD    Discharging Nurse: [facility] Unit/Room#: 6889/7623-L  Discharging Unit Phone Number: [phone]    Emergency Contact:   Extended Emergency Contact Information  Primary Emergency Contact: Amalia Davies  Address: [address] [place] [address], [address] Phone: [phone]  Relation: Parent  Secondary Emergency Contact: Francie Zapata  [facility] Phone: [phone]  Relation: Grandparent   needed? No    Past Surgical History:  History reviewed. No pertinent surgical history. Immunization History: There is no immunization history for the selected administration types on file for this patient.     Active Problems:  Patient Active Problem List   Diagnosis Code    Manic behavior ([facility] F30.10    Severe manic bipolar 1 disorder with psychotic behavior ([facility] F31.2       Isolation/Infection:   Isolation            No Isolation          Patient Infection Status       Infection Onset Added Last Indicated Last Indicated By Review Planned Expiration Resolved Resolved By    None active    Resolved    COVID-19 (Rule Out) 05/15/22 05/15/22 05/15/22 COVID-19 & Influenza Combo (Ordered)   05/15/22 Rule-Out Test Resulted            Nurse Assessment:  Last Vital Signs: BP (!) 113/57   Pulse 58   Temp 97.8 °F (36.6 °C)   Resp 14   Ht 6' 1\" (1.854 m)   Wt 185 lb (83.9 kg)   SpO2 99%   BMI 24.41 kg/m²     Last documented pain score (0-10 scale): Pain Level: 0  Last Weight:   Wt Readings from Last 1 Encounters:   05/15/22 185 lb (83.9 kg)     Mental Status:  oriented    IV Access:  - None    Nursing Mobility/ADLs:  Walking   Independent  Transfer  Independent  Bathing  Independent  Dressing [facility] Delivery   whole    Wound Care Documentation and Therapy:        Elimination:  Continence: Bowel: Yes  Bladder: Yes  Urinary Catheter: None   Colostomy/Ileostomy/Ileal Conduit: No       Date of Last BM: 05/20/22  No intake or output data in the 24 hours ending 05/20/22 1046  No intake/output data recorded. Safety Concerns:     None    Impairments/Disabilities:      None    Nutrition Therapy:  Current Nutrition Therapy:   - Oral Diet:  General    Routes of Feeding: Oral  Liquids: Thin Liquids  Daily Fluid Restriction: no  Last Modified Barium Swallow with Video (Video Swallowing Test): not done    Treatments at the Time of Hospital Discharge:   Respiratory Treatments: NA  Oxygen Therapy:  is not on home oxygen therapy.   Ventilator:    - No ventilator support    Rehab Therapies: NA  Weight Bearing Status/Restrictions: No weight bearing restrictions  Other Medical Equipment (for information only, NOT a DME order):  NA  Other Treatments: NA    Patient's personal belongings (please select all that are sent with patient):  {P DME Belongings:514706623}    RN SIGNATURE:  {Esignature:897620400}    CASE MANAGEMENT/SOCIAL WORK SECTION    Inpatient Status Date:    Readmission Risk Assessment Score:  Readmission Risk              Risk of Unplanned Readmission:  8           Discharging to Facility/ Agency   Name:   Address:  Phone:  Fax:    Dialysis Facility (if applicable)   Name:  Address:  Dialysis Schedule:  Phone:  Fax:    / signature: {Esignature:[id number]}    PHYSICIAN SECTION    Prognosis: {Prognosis:9865674591}    Condition at Discharge: [facility] Patient Condition:662976423}    Rehab Potential (if transferring to Rehab): {Prognosis:9949776232}    Recommended Labs or Other Treatments After Discharge: ***    Physician Certification: I certify the above information and transfer of Latasha Ahn  is necessary for the continuing treatment of the diagnosis listed and that he requires {Admit to Appropriate Level of Care:72794} for {GREATER/LESS:261306567} 30 days.      Update Admission H&P: {CHP DME Changes in [facility]:344163779}    PHYSICIAN SIGNATURE:  {Esignature:[id number]}

## 2022-05-20 NOTE — DISCHARGE INSTR - DIET

## 2022-05-20 NOTE — PROGRESS NOTES
Patient resting quietly with eyes closed. No S/S of distress noted or observed. Prn medications given at this time for anxiety. Will continue to monitor, provide needs and safe environment with continue rounding every 15 minutes.

## 2022-05-20 NOTE — PROGRESS NOTES
Pt seen up on unit sitting quietly, Pt alert and oriented, Pt reports he is feeling better and feels he is ready for discharge, Pt reports he plans to live with mom for awhile when discharged. denies SI/HI or AVH, Reports sleeping well, and taking his medications as scheduled. Pt attending groups and has good appetite with meals, Behavior is appropriate and pt is pleasant and cooperative with care providers.

## 2022-05-20 NOTE — CARE COORDINATION
SW met with patient to discuss discharge. Patient presents as calm, cooperative, and in behavioral control. Patient denies SI/HI/AVH. Patient at time of discharge does not present as risk to self or others.  Patient to discharge home with his mother who

## 2022-05-20 NOTE — GROUP NOTE
Date: 5/20/2022    Group Start Time: 1010  Group End Time: 3386  Group Topic: Psychoeducation    [facility] ACUTE [facility] 1    Lenora Gutierrez [place]                                                                        Group Therapy Note    Date: 5/20/2022  Number of Participants: 12    Type of Group: Psychoeducation    Wellness Binder Information  Module Name:  id of stressors     Patient's Deonte Ramirez will be able to id daily stressors and physical effects to stress. Notes:  Willing to share and id stressors and simple steps to coping. Status After Intervention:  Improved    Participation Level:  Active Listener and Interactive    Participation Quality: Appropriate, Attentive, and Sharing      Speech:  normal       Thought Process/Content: Logical      Affective Functioning: Congruent      Mood: euthymic      Level of consciousness:  Alert, Oriented x4, and Attentive      Response to Learning: Able to verbalize/acknowledge new learning, Able to retain information, and Progressing to goal      Endings: None Reported    Modes of Intervention: Education, Support, Socialization, and Exploration      Discipline Responsible: Psychoeducational Specialist      Signature:  Peg Olguin

## 2023-08-28 ENCOUNTER — OFFICE VISIT (OUTPATIENT)
Dept: FAMILY MEDICINE CLINIC | Age: 28
End: 2023-08-28
Payer: COMMERCIAL

## 2023-08-28 VITALS
HEART RATE: 91 BPM | RESPIRATION RATE: 20 BRPM | SYSTOLIC BLOOD PRESSURE: 119 MMHG | TEMPERATURE: 98.4 F | WEIGHT: 181 LBS | HEIGHT: 73 IN | BODY MASS INDEX: 23.99 KG/M2 | DIASTOLIC BLOOD PRESSURE: 71 MMHG

## 2023-08-28 DIAGNOSIS — Z76.89 ENCOUNTER TO ESTABLISH CARE: Primary | ICD-10-CM

## 2023-08-28 DIAGNOSIS — M54.41 ACUTE RIGHT-SIDED LOW BACK PAIN WITH RIGHT-SIDED SCIATICA: ICD-10-CM

## 2023-08-28 PROCEDURE — 99203 OFFICE O/P NEW LOW 30 MIN: CPT | Performed by: NEUROMUSCULOSKELETAL MEDICINE & OMM

## 2023-08-28 RX ORDER — KETOROLAC TROMETHAMINE 30 MG/ML
30 INJECTION, SOLUTION INTRAMUSCULAR; INTRAVENOUS ONCE
Status: COMPLETED | OUTPATIENT
Start: 2023-08-28 | End: 2023-08-28

## 2023-08-28 RX ORDER — KETOROLAC TROMETHAMINE 10 MG/1
10 TABLET, FILM COATED ORAL EVERY 6 HOURS PRN
Qty: 20 TABLET | Refills: 0 | Status: SHIPPED | OUTPATIENT
Start: 2023-08-28 | End: 2024-08-27

## 2023-08-28 RX ORDER — KETOROLAC TROMETHAMINE 30 MG/ML
30 INJECTION, SOLUTION INTRAMUSCULAR; INTRAVENOUS ONCE
Status: DISCONTINUED | OUTPATIENT
Start: 2023-08-28 | End: 2023-08-28

## 2023-08-28 RX ORDER — KETOROLAC TROMETHAMINE 10 MG/1
10 TABLET, FILM COATED ORAL EVERY 6 HOURS PRN
Qty: 20 TABLET | Refills: 0 | Status: SHIPPED
Start: 2023-08-28 | End: 2023-08-28

## 2023-08-28 RX ADMIN — KETOROLAC TROMETHAMINE 30 MG: 30 INJECTION, SOLUTION INTRAMUSCULAR; INTRAVENOUS at 11:52

## 2023-08-28 SDOH — ECONOMIC STABILITY: FOOD INSECURITY: WITHIN THE PAST 12 MONTHS, THE FOOD YOU BOUGHT JUST DIDN'T LAST AND YOU DIDN'T HAVE MONEY TO GET MORE.: NEVER TRUE

## 2023-08-28 SDOH — ECONOMIC STABILITY: FOOD INSECURITY: WITHIN THE PAST 12 MONTHS, YOU WORRIED THAT YOUR FOOD WOULD RUN OUT BEFORE YOU GOT MONEY TO BUY MORE.: NEVER TRUE

## 2023-08-28 SDOH — ECONOMIC STABILITY: INCOME INSECURITY: HOW HARD IS IT FOR YOU TO PAY FOR THE VERY BASICS LIKE FOOD, HOUSING, MEDICAL CARE, AND HEATING?: NOT HARD AT ALL

## 2023-08-28 SDOH — ECONOMIC STABILITY: HOUSING INSECURITY
IN THE LAST 12 MONTHS, WAS THERE A TIME WHEN YOU DID NOT HAVE A STEADY PLACE TO SLEEP OR SLEPT IN A SHELTER (INCLUDING NOW)?: NO

## 2023-08-28 ASSESSMENT — ENCOUNTER SYMPTOMS
CHOKING: 0
SHORTNESS OF BREATH: 0
CHEST TIGHTNESS: 0
COUGH: 0
BACK PAIN: 1

## 2023-08-28 ASSESSMENT — PATIENT HEALTH QUESTIONNAIRE - PHQ9
6. FEELING BAD ABOUT YOURSELF - OR THAT YOU ARE A FAILURE OR HAVE LET YOURSELF OR YOUR FAMILY DOWN: 0
7. TROUBLE CONCENTRATING ON THINGS, SUCH AS READING THE NEWSPAPER OR WATCHING TELEVISION: 0
SUM OF ALL RESPONSES TO PHQ QUESTIONS 1-9: 0
10. IF YOU CHECKED OFF ANY PROBLEMS, HOW DIFFICULT HAVE THESE PROBLEMS MADE IT FOR YOU TO DO YOUR WORK, TAKE CARE OF THINGS AT HOME, OR GET ALONG WITH OTHER PEOPLE: 0
5. POOR APPETITE OR OVEREATING: 0
1. LITTLE INTEREST OR PLEASURE IN DOING THINGS: 0
3. TROUBLE FALLING OR STAYING ASLEEP: 0
SUM OF ALL RESPONSES TO PHQ QUESTIONS 1-9: 0
9. THOUGHTS THAT YOU WOULD BE BETTER OFF DEAD, OR OF HURTING YOURSELF: 0
SUM OF ALL RESPONSES TO PHQ QUESTIONS 1-9: 0
SUM OF ALL RESPONSES TO PHQ9 QUESTIONS 1 & 2: 0
SUM OF ALL RESPONSES TO PHQ QUESTIONS 1-9: 0
2. FEELING DOWN, DEPRESSED OR HOPELESS: 0
4. FEELING TIRED OR HAVING LITTLE ENERGY: 0
8. MOVING OR SPEAKING SO SLOWLY THAT OTHER PEOPLE COULD HAVE NOTICED. OR THE OPPOSITE, BEING SO FIGETY OR RESTLESS THAT YOU HAVE BEEN MOVING AROUND A LOT MORE THAN USUAL: 0

## 2023-08-28 NOTE — PROGRESS NOTES
Chu Islas (:  1995) is a 32 y.o. male,New patient, here for evaluation of the following chief complaint(s):  Establish Care         ASSESSMENT/PLAN:  1. Encounter to establish care  2. Acute right-sided low back pain with right-sided sciatica  Comments:  Toradol 30mg IM x 1, then po Toradol 10mg every 6 hours for 5 days. Patient also given lower back and sciatica stretching exercises to do. Orders:  -     ketorolac (TORADOL) 10 MG tablet; Take 1 tablet by mouth every 6 hours as needed for Pain, Disp-20 tablet, R-0Normal      Return in about 2 weeks (around 2023) for to monitor medical conditions. Subjective   SUBJECTIVE/OBJECTIVE:  HPI 77-year-old male here to establish care. Patient presents with right-sided back pain since 2023 on a daily basis. Apparently there was no injury or trauma to the area. Pain radiates down his right lower back into his right leg just above his knee before stopping. Patient states that he has had no prior neck or back injury. Patient is tried over-the-counter medicine without much improvement. Review of Systems   Constitutional:  Negative for activity change, appetite change, chills, diaphoresis, fatigue and fever. Respiratory:  Negative for cough, choking, chest tightness and shortness of breath. Cardiovascular:  Negative for chest pain and palpitations. Musculoskeletal:  Positive for back pain (right sided lower back pain since 2023, on a daily basis. no apparent injury/trauma to the area. Pain does radiate down his right leg ending just above the knee. ). Negative for arthralgias, joint swelling, myalgias, neck pain and neck stiffness. Skin:  Negative for rash and wound. Neurological:  Negative for dizziness, tremors, seizures, syncope, speech difficulty, weakness, light-headedness, numbness and headaches. Psychiatric/Behavioral:  Negative for self-injury, sleep disturbance and suicidal ideas.          Objective   BP

## 2023-09-11 ENCOUNTER — OFFICE VISIT (OUTPATIENT)
Dept: FAMILY MEDICINE CLINIC | Age: 28
End: 2023-09-11
Payer: COMMERCIAL

## 2023-09-11 VITALS
OXYGEN SATURATION: 98 % | RESPIRATION RATE: 16 BRPM | BODY MASS INDEX: 24.07 KG/M2 | DIASTOLIC BLOOD PRESSURE: 64 MMHG | HEART RATE: 64 BPM | TEMPERATURE: 98.5 F | SYSTOLIC BLOOD PRESSURE: 101 MMHG | WEIGHT: 181.6 LBS | HEIGHT: 73 IN

## 2023-09-11 DIAGNOSIS — M54.41 ACUTE RIGHT-SIDED LOW BACK PAIN WITH RIGHT-SIDED SCIATICA: Primary | ICD-10-CM

## 2023-09-11 PROCEDURE — 99213 OFFICE O/P EST LOW 20 MIN: CPT | Performed by: NEUROMUSCULOSKELETAL MEDICINE & OMM

## 2023-09-11 ASSESSMENT — ENCOUNTER SYMPTOMS
BACK PAIN: 1
CHEST TIGHTNESS: 0
SHORTNESS OF BREATH: 0
CHOKING: 0
COUGH: 0

## 2023-09-11 NOTE — PROGRESS NOTES
the following reasons: The 2019 ASCVD risk score is only valid for ages 36 to 78     Educational materials and/or home exercises printed for patient's review and were included inpatient instructions on his/her After Visit Summary and given to patient at the end of visit.     regarding above diagnosis, including possible risks and complications,  especially if left uncontrolled. Counseled regarding the possible side effects, risks, benefits and alternatives to treatment; patient and/or guardian verbalizes understanding, agrees, feels comfortable with and wishes to proceed withabove treatment plan. Advised patient to call with any new medication issues, and read all Rx infofrom pharmacy to assure aware of all possible risks and side effects of medication before taking. age and gender appropriate health screening exams and vaccinations. Advised patient regarding importance of keeping up with recommended health maintenance and to schedule as soon as possible if overdue, as this isimportant in assessing for undiagnosed pathology, especially cancer, as well as protecting against potentially harmful/life threatening disease. Patient and/or guardian verbalizes understanding andagrees with above counseling, assessment and plan. All questions answered. An electronic signature was used to authenticate this note.     --Gi Nieves, DO

## 2024-08-14 ENCOUNTER — APPOINTMENT (OUTPATIENT)
Dept: CT IMAGING | Age: 29
End: 2024-08-14
Payer: COMMERCIAL

## 2024-08-14 ENCOUNTER — HOSPITAL ENCOUNTER (EMERGENCY)
Age: 29
Discharge: HOME | End: 2024-08-14
Attending: EMERGENCY MEDICINE
Payer: COMMERCIAL

## 2024-08-14 VITALS
HEIGHT: 74 IN | OXYGEN SATURATION: 98 % | SYSTOLIC BLOOD PRESSURE: 119 MMHG | WEIGHT: 185 LBS | DIASTOLIC BLOOD PRESSURE: 64 MMHG | BODY MASS INDEX: 23.74 KG/M2 | RESPIRATION RATE: 22 BRPM | TEMPERATURE: 98.7 F | HEART RATE: 72 BPM

## 2024-08-14 DIAGNOSIS — R18.8 PELVIC FLUID COLLECTION: ICD-10-CM

## 2024-08-14 DIAGNOSIS — M54.41 ACUTE RIGHT-SIDED LOW BACK PAIN WITH RIGHT-SIDED SCIATICA: Primary | ICD-10-CM

## 2024-08-14 DIAGNOSIS — R31.9 HEMATURIA, UNSPECIFIED TYPE: ICD-10-CM

## 2024-08-14 LAB
BACTERIA URNS QL MICRO: ABNORMAL
BILIRUB UR QL STRIP: NEGATIVE
CLARITY UR: CLEAR
COLOR UR: YELLOW
EPI CELLS #/AREA URNS HPF: ABNORMAL /HPF
GLUCOSE UR STRIP-MCNC: NEGATIVE MG/DL
HGB UR QL STRIP.AUTO: ABNORMAL
KETONES UR STRIP-MCNC: NEGATIVE MG/DL
LEUKOCYTE ESTERASE UR QL STRIP: NEGATIVE
NITRITE UR QL STRIP: NEGATIVE
PH UR STRIP: 7.5 [PH] (ref 5–9)
PROT UR STRIP-MCNC: NEGATIVE MG/DL
RBC #/AREA URNS HPF: ABNORMAL /HPF
SP GR UR STRIP: 1.01 (ref 1–1.03)
UROBILINOGEN UR STRIP-ACNC: 1 EU/DL (ref 0–1)
WBC #/AREA URNS HPF: ABNORMAL /HPF

## 2024-08-14 PROCEDURE — 96372 THER/PROPH/DIAG INJ SC/IM: CPT

## 2024-08-14 PROCEDURE — 99284 EMERGENCY DEPT VISIT MOD MDM: CPT

## 2024-08-14 PROCEDURE — 74176 CT ABD & PELVIS W/O CONTRAST: CPT

## 2024-08-14 PROCEDURE — 81001 URINALYSIS AUTO W/SCOPE: CPT

## 2024-08-14 PROCEDURE — 6360000002 HC RX W HCPCS: Performed by: EMERGENCY MEDICINE

## 2024-08-14 PROCEDURE — 72131 CT LUMBAR SPINE W/O DYE: CPT

## 2024-08-14 RX ORDER — DEXAMETHASONE SODIUM PHOSPHATE 10 MG/ML
10 INJECTION INTRAMUSCULAR; INTRAVENOUS ONCE
Status: COMPLETED | OUTPATIENT
Start: 2024-08-14 | End: 2024-08-14

## 2024-08-14 RX ORDER — METHOCARBAMOL 500 MG/1
500 TABLET, FILM COATED ORAL 4 TIMES DAILY
COMMUNITY

## 2024-08-14 RX ORDER — TRAMADOL HYDROCHLORIDE 50 MG/1
50 TABLET ORAL EVERY 6 HOURS PRN
Qty: 12 TABLET | Refills: 0 | Status: SHIPPED | OUTPATIENT
Start: 2024-08-14 | End: 2024-08-17

## 2024-08-14 RX ORDER — KETOROLAC TROMETHAMINE 30 MG/ML
60 INJECTION, SOLUTION INTRAMUSCULAR; INTRAVENOUS ONCE
Status: COMPLETED | OUTPATIENT
Start: 2024-08-14 | End: 2024-08-14

## 2024-08-14 RX ORDER — NAPROXEN 500 MG/1
500 TABLET ORAL 2 TIMES DAILY
Qty: 12 TABLET | Refills: 0 | Status: SHIPPED | OUTPATIENT
Start: 2024-08-14 | End: 2024-08-20

## 2024-08-14 RX ORDER — METHYLPREDNISOLONE 4 MG/1
TABLET ORAL
Qty: 1 KIT | Refills: 0 | Status: SHIPPED | OUTPATIENT
Start: 2024-08-14 | End: 2024-08-20

## 2024-08-14 RX ORDER — CYCLOBENZAPRINE HCL 10 MG
10 TABLET ORAL 3 TIMES DAILY PRN
COMMUNITY

## 2024-08-14 RX ADMIN — KETOROLAC TROMETHAMINE 60 MG: 60 INJECTION, SOLUTION INTRAMUSCULAR at 05:38

## 2024-08-14 RX ADMIN — DEXAMETHASONE SODIUM PHOSPHATE 10 MG: 10 INJECTION INTRAMUSCULAR; INTRAVENOUS at 05:38

## 2024-08-14 ASSESSMENT — PAIN SCALES - GENERAL
PAINLEVEL_OUTOF10: 6
PAINLEVEL_OUTOF10: 9

## 2024-08-14 ASSESSMENT — LIFESTYLE VARIABLES
HOW OFTEN DO YOU HAVE A DRINK CONTAINING ALCOHOL: MONTHLY OR LESS
HOW MANY STANDARD DRINKS CONTAINING ALCOHOL DO YOU HAVE ON A TYPICAL DAY: 1 OR 2

## 2024-08-14 ASSESSMENT — PAIN - FUNCTIONAL ASSESSMENT
PAIN_FUNCTIONAL_ASSESSMENT: 0-10
PAIN_FUNCTIONAL_ASSESSMENT: 0-10
PAIN_FUNCTIONAL_ASSESSMENT: PREVENTS OR INTERFERES SOME ACTIVE ACTIVITIES AND ADLS

## 2024-08-14 ASSESSMENT — PAIN DESCRIPTION - ORIENTATION
ORIENTATION: LOWER
ORIENTATION: LOWER;MID

## 2024-08-14 ASSESSMENT — PAIN DESCRIPTION - LOCATION
LOCATION: BACK
LOCATION: BACK

## 2024-08-14 ASSESSMENT — PAIN DESCRIPTION - DESCRIPTORS
DESCRIPTORS: ACHING;DISCOMFORT
DESCRIPTORS: ACHING;SORE;TENDER

## 2024-08-14 ASSESSMENT — PAIN DESCRIPTION - ONSET: ONSET: ON-GOING

## 2024-08-14 ASSESSMENT — PAIN DESCRIPTION - PAIN TYPE: TYPE: ACUTE PAIN

## 2024-08-14 ASSESSMENT — PAIN DESCRIPTION - FREQUENCY: FREQUENCY: CONTINUOUS

## 2024-08-14 NOTE — DISCHARGE INSTRUCTIONS
You need an MRI of your lower back is soon as possible return if any new neuro neurodeficits or any new weakness or pain or any fevers or vomiting you need to get an MRI of your back to evaluate for sciatica return if any problems having urinations or bowel movement no driving or operating machinery going to high places while taking Ultram you have a small amount of fluid in your pelvis of your abdomen which is nonspecific but should follow-up you should follow-up and return if increased abdominal pain fevers vomiting or bleeding

## 2024-08-23 ENCOUNTER — OFFICE VISIT (OUTPATIENT)
Dept: FAMILY MEDICINE CLINIC | Age: 29
End: 2024-08-23

## 2024-08-23 VITALS
TEMPERATURE: 97.4 F | HEIGHT: 74 IN | OXYGEN SATURATION: 98 % | RESPIRATION RATE: 18 BRPM | HEART RATE: 76 BPM | WEIGHT: 184.4 LBS | SYSTOLIC BLOOD PRESSURE: 99 MMHG | DIASTOLIC BLOOD PRESSURE: 65 MMHG | BODY MASS INDEX: 23.66 KG/M2

## 2024-08-23 DIAGNOSIS — M54.41 ACUTE RIGHT-SIDED LOW BACK PAIN WITH RIGHT-SIDED SCIATICA: Primary | ICD-10-CM

## 2024-08-23 DIAGNOSIS — R31.9 HEMATURIA, UNSPECIFIED TYPE: ICD-10-CM

## 2024-08-23 DIAGNOSIS — R31.21 ASYMPTOMATIC MICROSCOPIC HEMATURIA: ICD-10-CM

## 2024-08-23 LAB
BILIRUBIN, POC: NEGATIVE
BLOOD URINE, POC: NORMAL
CLARITY, POC: CLEAR
COLOR, POC: YELLOW
GLUCOSE URINE, POC: NEGATIVE
KETONES, POC: NEGATIVE
LEUKOCYTE EST, POC: NEGATIVE
NITRITE, POC: NEGATIVE
PH, POC: 6
PROTEIN, POC: NEGATIVE
SPECIFIC GRAVITY, POC: 1.02
UROBILINOGEN, POC: NORMAL

## 2024-08-23 RX ORDER — PREDNISONE 20 MG/1
20 TABLET ORAL 2 TIMES DAILY
Qty: 14 TABLET | Refills: 0 | Status: SHIPPED | OUTPATIENT
Start: 2024-08-23 | End: 2024-08-30

## 2024-08-23 ASSESSMENT — ENCOUNTER SYMPTOMS
COUGH: 0
SHORTNESS OF BREATH: 0
CHEST TIGHTNESS: 0
BACK PAIN: 1
VOMITING: 0
BLOOD IN STOOL: 0
ABDOMINAL PAIN: 0
CHOKING: 0
WHEEZING: 0
NAUSEA: 0
CONSTIPATION: 0
DIARRHEA: 0
ABDOMINAL DISTENTION: 0

## 2024-08-23 ASSESSMENT — PATIENT HEALTH QUESTIONNAIRE - PHQ9
5. POOR APPETITE OR OVEREATING: NOT AT ALL
3. TROUBLE FALLING OR STAYING ASLEEP: NOT AT ALL
9. THOUGHTS THAT YOU WOULD BE BETTER OFF DEAD, OR OF HURTING YOURSELF: NOT AT ALL
SUM OF ALL RESPONSES TO PHQ QUESTIONS 1-9: 0
2. FEELING DOWN, DEPRESSED OR HOPELESS: NOT AT ALL
8. MOVING OR SPEAKING SO SLOWLY THAT OTHER PEOPLE COULD HAVE NOTICED. OR THE OPPOSITE, BEING SO FIGETY OR RESTLESS THAT YOU HAVE BEEN MOVING AROUND A LOT MORE THAN USUAL: NOT AT ALL
SUM OF ALL RESPONSES TO PHQ QUESTIONS 1-9: 0
4. FEELING TIRED OR HAVING LITTLE ENERGY: NOT AT ALL
SUM OF ALL RESPONSES TO PHQ9 QUESTIONS 1 & 2: 0
SUM OF ALL RESPONSES TO PHQ QUESTIONS 1-9: 0
6. FEELING BAD ABOUT YOURSELF - OR THAT YOU ARE A FAILURE OR HAVE LET YOURSELF OR YOUR FAMILY DOWN: NOT AT ALL
10. IF YOU CHECKED OFF ANY PROBLEMS, HOW DIFFICULT HAVE THESE PROBLEMS MADE IT FOR YOU TO DO YOUR WORK, TAKE CARE OF THINGS AT HOME, OR GET ALONG WITH OTHER PEOPLE: NOT DIFFICULT AT ALL
SUM OF ALL RESPONSES TO PHQ QUESTIONS 1-9: 0
7. TROUBLE CONCENTRATING ON THINGS, SUCH AS READING THE NEWSPAPER OR WATCHING TELEVISION: NOT AT ALL
1. LITTLE INTEREST OR PLEASURE IN DOING THINGS: NOT AT ALL

## 2024-08-23 NOTE — PROGRESS NOTES
Benedicto Davies (:  1995) is a 28 y.o. male,Established patient, here for evaluation of the following chief complaint(s):  ED Follow-up and Lower Back Pain (Right side/)      Assessment & Plan   ASSESSMENT/PLAN:  1. Acute right-sided low back pain with right-sided sciatica  -     External Referral To Physical Therapy  -     predniSONE (DELTASONE) 20 MG tablet; Take 1 tablet by mouth 2 times daily for 7 days, Disp-14 tablet, R-0Normal  2. Asymptomatic microscopic hematuria  -     POCT Urinalysis no Micro      Return in about 6 weeks (around 10/4/2024) for to monitor medical conditions.         Subjective   SUBJECTIVE/OBJECTIVE:  HPI 29 yo male here for ED Follow-up and Lower Back Pain (Right side/)    Review of Systems   Constitutional:  Negative for activity change, appetite change, chills, diaphoresis, fatigue and fever.   HENT:  Negative for dental problem.    Eyes:  Negative for visual disturbance.   Respiratory:  Negative for cough, choking, chest tightness, shortness of breath and wheezing.    Cardiovascular:  Negative for chest pain and palpitations.   Gastrointestinal:  Negative for abdominal distention, abdominal pain, blood in stool, constipation, diarrhea, nausea and vomiting.   Genitourinary:  Negative for difficulty urinating.   Musculoskeletal:  Positive for back pain (chronic right sided lower back pain with sciatica pain into his right thigh, lateally. Feels like pins and needles.). Negative for gait problem, joint swelling, myalgias, neck pain and neck stiffness.   Skin:  Negative for rash and wound.   Neurological:  Negative for dizziness, seizures, syncope, speech difficulty, weakness, light-headedness, numbness and headaches.   Psychiatric/Behavioral:  Negative for agitation, behavioral problems, confusion, decreased concentration, self-injury, sleep disturbance and suicidal ideas. The patient is not nervous/anxious.           Objective   BP 99/65   Pulse 76   Temp 97.4 °F (36.3 °C)

## 2024-08-25 LAB
CULTURE: NO GROWTH
SPECIMEN DESCRIPTION: NORMAL